# Patient Record
Sex: FEMALE | Race: WHITE | Employment: PART TIME | URBAN - METROPOLITAN AREA
[De-identification: names, ages, dates, MRNs, and addresses within clinical notes are randomized per-mention and may not be internally consistent; named-entity substitution may affect disease eponyms.]

---

## 2018-11-02 ENCOUNTER — HOSPITAL ENCOUNTER (EMERGENCY)
Age: 53
Discharge: HOME | End: 2018-11-02
Attending: EMERGENCY MEDICINE
Payer: COMMERCIAL

## 2018-11-02 ENCOUNTER — APPOINTMENT (OUTPATIENT)
Dept: CT IMAGING | Age: 53
End: 2018-11-02
Payer: COMMERCIAL

## 2018-11-02 VITALS
HEART RATE: 88 BPM | OXYGEN SATURATION: 98 % | RESPIRATION RATE: 18 BRPM | TEMPERATURE: 97.9 F | DIASTOLIC BLOOD PRESSURE: 79 MMHG | SYSTOLIC BLOOD PRESSURE: 122 MMHG | WEIGHT: 150 LBS

## 2018-11-02 DIAGNOSIS — R11.2 NAUSEA VOMITING AND DIARRHEA: Primary | ICD-10-CM

## 2018-11-02 DIAGNOSIS — R19.7 NAUSEA VOMITING AND DIARRHEA: Primary | ICD-10-CM

## 2018-11-02 LAB
ANION GAP SERPL CALCULATED.3IONS-SCNC: 14 MMOL/L (ref 3–16)
BACTERIA: ABNORMAL /HPF
BASOPHILS ABSOLUTE: 0.1 K/UL (ref 0–0.2)
BASOPHILS RELATIVE PERCENT: 0.8 %
BILIRUBIN URINE: NEGATIVE MG/DL
BLOOD, URINE: ABNORMAL
BUN BLDV-MCNC: 17 MG/DL (ref 7–20)
CALCIUM SERPL-MCNC: 9.3 MG/DL (ref 8.3–10.6)
CHLORIDE BLD-SCNC: 106 MMOL/L (ref 99–110)
CLARITY: ABNORMAL
CO2: 22 MMOL/L (ref 21–32)
COLOR: ABNORMAL
CREAT SERPL-MCNC: <0.5 MG/DL (ref 0.6–1.1)
EOSINOPHILS ABSOLUTE: 0.1 K/UL (ref 0–0.6)
EOSINOPHILS RELATIVE PERCENT: 0.5 %
EPITHELIAL CELLS, UA: ABNORMAL /HPF
GFR AFRICAN AMERICAN: >60
GFR NON-AFRICAN AMERICAN: >60
GLUCOSE BLD-MCNC: 107 MG/DL (ref 70–99)
GLUCOSE URINE: NEGATIVE MG/DL
HCT VFR BLD CALC: 44.3 % (ref 36–48)
HEMOGLOBIN: 14.6 G/DL (ref 12–16)
KETONES, URINE: NEGATIVE MG/DL
LEUKOCYTE ESTERASE, URINE: NEGATIVE
LYMPHOCYTES ABSOLUTE: 2.7 K/UL (ref 1–5.1)
LYMPHOCYTES RELATIVE PERCENT: 15.4 %
MCH RBC QN AUTO: 32.2 PG (ref 26–34)
MCHC RBC AUTO-ENTMCNC: 32.9 G/DL (ref 31–36)
MCV RBC AUTO: 97.9 FL (ref 80–100)
MICROSCOPIC EXAMINATION: YES
MONOCYTES ABSOLUTE: 1.6 K/UL (ref 0–1.3)
MONOCYTES RELATIVE PERCENT: 9.2 %
MUCUS: ABNORMAL /LPF
NEUTROPHILS ABSOLUTE: 13.2 K/UL (ref 1.7–7.7)
NEUTROPHILS RELATIVE PERCENT: 74.1 %
NITRITE, URINE: NEGATIVE
PDW BLD-RTO: 14.1 % (ref 12.4–15.4)
PH UA: 6
PLATELET # BLD: 231 K/UL (ref 135–450)
PMV BLD AUTO: 8.5 FL (ref 5–10.5)
POTASSIUM SERPL-SCNC: 3.6 MMOL/L (ref 3.5–5.1)
PREGNANCY, URINE: NEGATIVE
PROTEIN UA: ABNORMAL MG/DL
RBC # BLD: 4.53 M/UL (ref 4–5.2)
RBC UA: ABNORMAL /HPF (ref 0–2)
SODIUM BLD-SCNC: 142 MMOL/L (ref 136–145)
SPECIFIC GRAVITY UA: >=1.03
UROBILINOGEN, URINE: 0.2 E.U./DL
WBC # BLD: 17.8 K/UL (ref 4–11)
WBC UA: ABNORMAL /HPF (ref 0–5)

## 2018-11-02 PROCEDURE — 6360000004 HC RX CONTRAST MEDICATION: Performed by: EMERGENCY MEDICINE

## 2018-11-02 PROCEDURE — 80048 BASIC METABOLIC PNL TOTAL CA: CPT

## 2018-11-02 PROCEDURE — 2580000003 HC RX 258: Performed by: EMERGENCY MEDICINE

## 2018-11-02 PROCEDURE — 99284 EMERGENCY DEPT VISIT MOD MDM: CPT

## 2018-11-02 PROCEDURE — 84703 CHORIONIC GONADOTROPIN ASSAY: CPT

## 2018-11-02 PROCEDURE — 74177 CT ABD & PELVIS W/CONTRAST: CPT

## 2018-11-02 PROCEDURE — 6360000002 HC RX W HCPCS: Performed by: EMERGENCY MEDICINE

## 2018-11-02 PROCEDURE — 96374 THER/PROPH/DIAG INJ IV PUSH: CPT

## 2018-11-02 PROCEDURE — 96361 HYDRATE IV INFUSION ADD-ON: CPT

## 2018-11-02 PROCEDURE — 85025 COMPLETE CBC W/AUTO DIFF WBC: CPT

## 2018-11-02 PROCEDURE — 81001 URINALYSIS AUTO W/SCOPE: CPT

## 2018-11-02 RX ORDER — ONDANSETRON 2 MG/ML
4 INJECTION INTRAMUSCULAR; INTRAVENOUS ONCE
Status: COMPLETED | OUTPATIENT
Start: 2018-11-02 | End: 2018-11-02

## 2018-11-02 RX ORDER — 0.9 % SODIUM CHLORIDE 0.9 %
500 INTRAVENOUS SOLUTION INTRAVENOUS ONCE
Status: COMPLETED | OUTPATIENT
Start: 2018-11-02 | End: 2018-11-02

## 2018-11-02 RX ADMIN — ONDANSETRON 4 MG: 2 INJECTION INTRAMUSCULAR; INTRAVENOUS at 10:59

## 2018-11-02 RX ADMIN — SODIUM CHLORIDE 500 ML: 9 INJECTION, SOLUTION INTRAVENOUS at 10:59

## 2018-11-02 RX ADMIN — IOPAMIDOL 80 ML: 755 INJECTION, SOLUTION INTRAVENOUS at 11:55

## 2018-11-02 ASSESSMENT — ENCOUNTER SYMPTOMS
EYE REDNESS: 0
COLOR CHANGE: 0
EYE DISCHARGE: 0
PHOTOPHOBIA: 0
BLOOD IN STOOL: 0
ABDOMINAL DISTENTION: 0
APNEA: 0
RECTAL PAIN: 0
CHEST TIGHTNESS: 0
EYE PAIN: 0
NAUSEA: 1
COUGH: 0
ABDOMINAL PAIN: 1
STRIDOR: 0
DIARRHEA: 0
BACK PAIN: 0
CONSTIPATION: 0
FACIAL SWELLING: 0
CHOKING: 0
SHORTNESS OF BREATH: 0
ANAL BLEEDING: 0
EYE ITCHING: 0
VOMITING: 1

## 2018-11-02 ASSESSMENT — PAIN SCALES - GENERAL
PAINLEVEL_OUTOF10: 5
PAINLEVEL_OUTOF10: 0

## 2018-11-02 ASSESSMENT — PAIN DESCRIPTION - LOCATION: LOCATION: ABDOMEN

## 2018-11-02 ASSESSMENT — PAIN DESCRIPTION - ORIENTATION: ORIENTATION: LEFT;LOWER

## 2018-11-02 NOTE — ED PROVIDER NOTES
[address] [facility]          EM RESIDENT NOTE       Date of evaluation: 11/2/2018    Chief Complaint     Pelvic Pain and Nausea    History of Present Illness     Juju Interiano is a 48 y.o. female with PMHx of Crohn disease from 36 with last relapse on 1991. who presents with left lower abdominal pain of 2 days duration, pain was severe 8/10, dull in nature, constant, no radiation, associated with nausea and vomiting. Patient endorse she has bowel movement mixed with mucus yesterday but no blood, patient denies H of constipation, diarrhea, Bloating, Hx of travelling, ill contact, joint pain, chest pain, SOB, rash. Review of Systems     Review of Systems   Constitutional: Negative for activity change, appetite change, chills, diaphoresis, fatigue and fever. HENT: Negative for congestion, dental problem, drooling, ear discharge, ear pain, facial swelling, hearing loss and mouth sores. Eyes: Negative for photophobia, pain, discharge, redness and itching. Respiratory: Negative for apnea, cough, choking, chest tightness, shortness of breath and stridor. Cardiovascular: Negative for chest pain, palpitations and leg swelling. Gastrointestinal: Positive for abdominal pain, nausea and vomiting. Negative for abdominal distention, anal bleeding, blood in stool, constipation, diarrhea and rectal pain. Endocrine: Negative for cold intolerance, heat intolerance and polydipsia. Genitourinary: Negative for difficulty urinating, dyspareunia, dysuria, enuresis, flank pain, frequency, genital sores and hematuria. Musculoskeletal: Negative for arthralgias, back pain, gait problem, joint swelling and myalgias. Skin: Negative for color change, pallor, rash and wound. Neurological: Negative for dizziness, seizures, facial asymmetry, light-headedness, numbness and headaches.    Psychiatric/Behavioral: Negative for agitation, behavioral problems, confusion, decreased concentration Result      1. Diffuse colonic wall thickening involving the descending and proximal sigmoid colon. Findings most consistent with underlying inflammatory process such as colitis. Correlate clinically. 2.  Scattered sigmoid diverticulosis. 3.  Hepatomegaly. 4.  Small amount of free fluid in the right paracolic gutter and cul-de-sac, nonspecific. 5.  Multiple subcentimeter hypodense hepatic lesions consistent with hepatic cysts.       6.  Left renal cyst.          LABS:   Results for orders placed or performed during the hospital encounter of 11/02/18   CBC auto differential   Result Value Ref Range    WBC 17.8 (H) 4.0 - 11.0 K/uL    RBC 4.53 4.00 - 5.20 M/uL    Hemoglobin 14.6 12.0 - 16.0 g/dL    Hematocrit 44.3 36.0 - 48.0 %    MCV 97.9 80.0 - 100.0 fL    MCH 32.2 26.0 - 34.0 pg    MCHC 32.9 31.0 - 36.0 g/dL    RDW 14.1 12.4 - 15.4 %    Platelets 974 660 - 756 K/uL    MPV 8.5 5.0 - 10.5 fL    Neutrophils % 74.1 %    Lymphocytes % 15.4 %    Monocytes % 9.2 %    Eosinophils % 0.5 %    Basophils % 0.8 %    Neutrophils # 13.2 (H) 1.7 - 7.7 K/uL    Lymphocytes # 2.7 1.0 - 5.1 K/uL    Monocytes # 1.6 (H) 0.0 - 1.3 K/uL    Eosinophils # 0.1 0.0 - 0.6 K/uL    Basophils # 0.1 0.0 - 0.2 K/uL   Basic Metabolic Panel   Result Value Ref Range    Sodium 142 136 - 145 mmol/L    Potassium 3.6 3.5 - 5.1 mmol/L    Chloride 106 99 - 110 mmol/L    CO2 22 21 - 32 mmol/L    Anion Gap 14 3 - 16    Glucose 107 (H) 70 - 99 mg/dL    BUN 17 7 - 20 mg/dL    CREATININE <0.5 (L) 0.6 - 1.1 mg/dL    GFR Non-African American >60 >60    GFR African American >60 >60    Calcium 9.3 8.3 - 10.6 mg/dL   Microscopic Urinalysis   Result Value Ref Range    Mucus, UA 2+ (A) /LPF    WBC, UA 3-5 0 - 5 /HPF    RBC, UA 5-10 (A) 0 - 2 /HPF    Epi Cells 20-50 /HPF    Bacteria, UA 2+ (A) /HPF   POC URINE with Microscopic   Result Value Ref Range    Color, UA Not Entered Straw/Yellow    Clarity, UA Not Entered Clear    Glucose, Ur Negative

## 2019-03-10 ENCOUNTER — HOSPITAL ENCOUNTER (EMERGENCY)
Age: 54
Discharge: HOME | End: 2019-03-10
Attending: EMERGENCY MEDICINE
Payer: COMMERCIAL

## 2019-03-10 ENCOUNTER — APPOINTMENT (OUTPATIENT)
Dept: GENERAL RADIOLOGY | Age: 54
End: 2019-03-10
Payer: COMMERCIAL

## 2019-03-10 VITALS
TEMPERATURE: 98.3 F | WEIGHT: 150 LBS | HEIGHT: 64 IN | DIASTOLIC BLOOD PRESSURE: 90 MMHG | HEART RATE: 90 BPM | RESPIRATION RATE: 16 BRPM | SYSTOLIC BLOOD PRESSURE: 142 MMHG | BODY MASS INDEX: 25.61 KG/M2 | OXYGEN SATURATION: 95 %

## 2019-03-10 DIAGNOSIS — J06.9 VIRAL URI: Primary | ICD-10-CM

## 2019-03-10 PROCEDURE — 99284 EMERGENCY DEPT VISIT MOD MDM: CPT

## 2019-03-10 PROCEDURE — 6370000000 HC RX 637 (ALT 250 FOR IP): Performed by: STUDENT IN AN ORGANIZED HEALTH CARE EDUCATION/TRAINING PROGRAM

## 2019-03-10 PROCEDURE — 94640 AIRWAY INHALATION TREATMENT: CPT

## 2019-03-10 PROCEDURE — 71046 X-RAY EXAM CHEST 2 VIEWS: CPT

## 2019-03-10 PROCEDURE — 94761 N-INVAS EAR/PLS OXIMETRY MLT: CPT

## 2019-03-10 PROCEDURE — 94664 DEMO&/EVAL PT USE INHALER: CPT

## 2019-03-10 RX ORDER — IPRATROPIUM BROMIDE AND ALBUTEROL SULFATE 2.5; .5 MG/3ML; MG/3ML
1 SOLUTION RESPIRATORY (INHALATION) ONCE
Status: COMPLETED | OUTPATIENT
Start: 2019-03-10 | End: 2019-03-10

## 2019-03-10 RX ORDER — IBUPROFEN 400 MG/1
400 TABLET ORAL ONCE
Status: COMPLETED | OUTPATIENT
Start: 2019-03-10 | End: 2019-03-10

## 2019-03-10 RX ADMIN — IBUPROFEN 400 MG: 400 TABLET, FILM COATED ORAL at 23:35

## 2019-03-10 RX ADMIN — IPRATROPIUM BROMIDE AND ALBUTEROL SULFATE 1 AMPULE: .5; 3 SOLUTION RESPIRATORY (INHALATION) at 22:52

## 2019-03-10 ASSESSMENT — PAIN SCALES - GENERAL
PAINLEVEL_OUTOF10: 5
PAINLEVEL_OUTOF10: 5

## 2019-03-10 ASSESSMENT — ENCOUNTER SYMPTOMS
SHORTNESS OF BREATH: 1
COUGH: 1
ABDOMINAL PAIN: 0
DIARRHEA: 0
VOMITING: 0
NAUSEA: 0
SORE THROAT: 0
WHEEZING: 1

## 2019-03-10 ASSESSMENT — PAIN DESCRIPTION - PAIN TYPE: TYPE: ACUTE PAIN

## 2019-03-10 ASSESSMENT — PAIN DESCRIPTION - LOCATION: LOCATION: NECK;HEAD

## 2020-08-26 ENCOUNTER — HOSPITAL ENCOUNTER (EMERGENCY)
Age: 55
Discharge: HOME | End: 2020-08-26
Payer: COMMERCIAL

## 2020-08-26 VITALS
DIASTOLIC BLOOD PRESSURE: 71 MMHG | HEART RATE: 65 BPM | SYSTOLIC BLOOD PRESSURE: 120 MMHG | BODY MASS INDEX: 26.58 KG/M2 | WEIGHT: 150 LBS | HEIGHT: 63 IN | RESPIRATION RATE: 16 BRPM | OXYGEN SATURATION: 96 % | TEMPERATURE: 98.2 F

## 2020-08-26 PROCEDURE — 99282 EMERGENCY DEPT VISIT SF MDM: CPT

## 2020-08-26 PROCEDURE — 2500000003 HC RX 250 WO HCPCS: Performed by: PHYSICIAN ASSISTANT

## 2020-08-26 PROCEDURE — 90715 TDAP VACCINE 7 YRS/> IM: CPT | Performed by: PHYSICIAN ASSISTANT

## 2020-08-26 PROCEDURE — 6360000002 HC RX W HCPCS: Performed by: PHYSICIAN ASSISTANT

## 2020-08-26 PROCEDURE — 90471 IMMUNIZATION ADMIN: CPT | Performed by: PHYSICIAN ASSISTANT

## 2020-08-26 PROCEDURE — 6370000000 HC RX 637 (ALT 250 FOR IP): Performed by: PHYSICIAN ASSISTANT

## 2020-08-26 PROCEDURE — 12001 RPR S/N/AX/GEN/TRNK 2.5CM/<: CPT

## 2020-08-26 RX ORDER — LIDOCAINE HYDROCHLORIDE AND EPINEPHRINE 10; 10 MG/ML; UG/ML
10 INJECTION, SOLUTION INFILTRATION; PERINEURAL ONCE
Status: COMPLETED | OUTPATIENT
Start: 2020-08-26 | End: 2020-08-26

## 2020-08-26 RX ORDER — BACITRACIN ZINC AND POLYMYXIN B SULFATE 500; 1000 [USP'U]/G; [USP'U]/G
OINTMENT TOPICAL ONCE
Status: COMPLETED | OUTPATIENT
Start: 2020-08-26 | End: 2020-08-26

## 2020-08-26 RX ORDER — GINSENG 100 MG
CAPSULE ORAL
Status: COMPLETED
Start: 2020-08-26 | End: 2020-08-26

## 2020-08-26 RX ADMIN — LIDOCAINE HYDROCHLORIDE,EPINEPHRINE BITARTRATE 10 ML: 10; .01 INJECTION, SOLUTION INFILTRATION; PERINEURAL at 14:23

## 2020-08-26 RX ADMIN — BACITRACIN ZINC AND POLYMYXIN B SULFATE: at 15:55

## 2020-08-26 RX ADMIN — TETANUS TOXOID, REDUCED DIPHTHERIA TOXOID AND ACELLULAR PERTUSSIS VACCINE, ADSORBED 0.5 ML: 5; 2.5; 8; 8; 2.5 SUSPENSION INTRAMUSCULAR at 14:20

## 2020-08-26 ASSESSMENT — PAIN SCALES - GENERAL: PAINLEVEL_OUTOF10: 0

## 2020-08-26 ASSESSMENT — ENCOUNTER SYMPTOMS: COLOR CHANGE: 0

## 2020-08-26 NOTE — ED PROVIDER NOTES
[facility]          PHYSICIAN ASSISTANT NOTE       Date of evaluation: 8/26/2020    Chief Complaint     Laceration (Right foot laceration after stepping on a piece of glass. Dressing in place on arrival)      History of Present Illness     Lucita Interiano is a 54 y.o. female who presents to the emergency department with a right foot laceration. Patient states that she was at her mother's place around noon today cleaning up the basement. She states that she went to walk upstairs and stepped on a piece of glass. It caused a small laceration to the plantar surface of her foot at the base of the great toe. Bleeding is controlled. No numbness. Patient is unsure of her last tetanus. Review of Systems     Review of Systems   Constitutional: Negative for chills, diaphoresis and fever. Musculoskeletal: Negative for arthralgias. Skin: Positive for wound. Negative for color change, pallor and rash. Allergic/Immunologic: Negative for environmental allergies, food allergies and immunocompromised state. Neurological: Negative for dizziness. Hematological: Negative for adenopathy. Does not bruise/bleed easily. Psychiatric/Behavioral: Negative for self-injury. Past Medical, Surgical, Family, and Social History     She has no past medical history on file. She has no past surgical history on file. Her family history is not on file. She reports that she has been smoking. She has been smoking about 1.00 pack per day. She has never used smokeless tobacco. She reports that she does not drink alcohol or use drugs. Medications     There are no discharge medications for this patient. Allergies     She has No Known Allergies. Physical Exam     INITIAL VITALS: BP: 105/69,Temp: 98.2 °F (36.8 °C), Pulse: 64, Resp: 16, SpO2: 97 %   Physical Exam  Constitutional:       General: She is not in acute distress. Appearance: Normal appearance.  She is not procedure: Tolerated well, no immediate complications  Comments:      Lac repair performed by the PA student. I was present for the entirety of the procedure. ED Course     Nursing Notes, Past Medical Hx, Past Surgical Hx, Social Hx, Allergies, and Family Hx were reviewed. The patient was given the followingmedications:  Orders Placed This Encounter   Medications    lidocaine-EPINEPHrine 1 percent-1:[id number] injection 10 mL    Tetanus-Diphth-Acell Pertussis (BOOSTRIX) injection 0.5 mL    bacitracin-polymyxin b (POLYSPORIN) ointment    bacitracin 500 UNIT/GM ointment     Mesa Peal: cabinet override       CONSULTS:  None    MEDICAL Angelina Langstonlon / ASSESSMENT / Maryanne Ashley is a 54 y.o. female who presented to the emergency department with a right foot laceration after stepping on a piece of glass 2 hours prior to arrival.  Bleeding is controlled. Patient denies any numbness. She is unsure of her last tetanus. Physical exam reveals a 1 cm V-shaped laceration on the plantar surface of the right foot base of the first toe. It is hemostatic. Intact range of motion of the right foot and toes. Sensation intact. Distal pulses palpable. Tetanus was updated today. Patient took ibuprofen earlier today prior to arrival.  Wound was anesthetized using 1% lidocaine with epinephrine. Wound was explored and no glass was visualized. Wound was extensively irrigated. It was repaired with 3 nonabsorbable sutures. Refer to procedure note above. Bulky dressing was placed over the wound. Patient was instructed on suture removal in 7 to 10 days. She is given return precautions in the event of developing infection. Clinical Impression     1.  Laceration of right foot, initial encounter        Disposition     PATIENT REFERRED TO:  Lorenzo Loving., MD  [address] [place] [id number] [phone]    In 1 week  For suture removal      DISCHARGE MEDICATIONS:  There are no discharge medications for this patient.        DISPOSITION Decision To Discharge 08/26/2020 03:54:38 PM       Selam Coronado PA-C  08/26/20 Bakari Zavaleta PA-C  08/26/20 1991

## 2020-08-26 NOTE — ED NOTES
Right foot with laceration on ball of foot just below great toe. Bleeding controlled. Approx 0.5cm in length. No active bleeding.  Full ROM intact     Owen Bach RN  08/26/20 [id number]

## 2020-08-26 NOTE — ED NOTES
Bed: A03-03  Expected date:   Expected time:   Means of arrival:   Comments:  NO USE     Héctor Cardoza RN  08/26/20 2093

## 2020-08-26 NOTE — ED NOTES
Patient prepared for and ready to be discharged. Dressed in clothes and given belongings. Patient discharged at this time in no acute distress after she verbalized understanding of discharge instructions. Reviewed medications, and when to return to the ED with patient.  Encouraged follow up with PCP in 7-10 days for suture removal.  Patient walked to waleska Mcmillan RN  08/26/20 2024

## 2020-09-29 ENCOUNTER — APPOINTMENT (OUTPATIENT)
Dept: CT IMAGING | Age: 55
DRG: 720 | End: 2020-09-29
Payer: COMMERCIAL

## 2020-09-29 ENCOUNTER — HOSPITAL ENCOUNTER (INPATIENT)
Age: 55
LOS: 5 days | Discharge: HOME | DRG: 720 | End: 2020-10-04
Attending: EMERGENCY MEDICINE | Admitting: INTERNAL MEDICINE
Payer: COMMERCIAL

## 2020-09-29 PROBLEM — R10.9 FLANK PAIN: Status: ACTIVE | Noted: 2020-09-29

## 2020-09-29 LAB
A/G RATIO: 1.8 (ref 1.1–2.2)
ALBUMIN SERPL-MCNC: 4.4 G/DL (ref 3.4–5)
ALP BLD-CCNC: 97 U/L (ref 40–129)
ALT SERPL-CCNC: 38 U/L (ref 10–40)
ANION GAP SERPL CALCULATED.3IONS-SCNC: 13 MMOL/L (ref 3–16)
AST SERPL-CCNC: 31 U/L (ref 15–37)
BACTERIA: ABNORMAL /HPF
BASOPHILS ABSOLUTE: 0.1 K/UL (ref 0–0.2)
BASOPHILS RELATIVE PERCENT: 1 %
BILIRUB SERPL-MCNC: 0.3 MG/DL (ref 0–1)
BILIRUBIN URINE: ABNORMAL
BLOOD, URINE: ABNORMAL
BUN BLDV-MCNC: 26 MG/DL (ref 7–20)
CALCIUM SERPL-MCNC: 9.2 MG/DL (ref 8.3–10.6)
CHLORIDE BLD-SCNC: 108 MMOL/L (ref 99–110)
CLARITY: ABNORMAL
CO2: 23 MMOL/L (ref 21–32)
COLOR: ABNORMAL
CREAT SERPL-MCNC: 0.8 MG/DL (ref 0.6–1.1)
EOSINOPHILS ABSOLUTE: 0.3 K/UL (ref 0–0.6)
EOSINOPHILS RELATIVE PERCENT: 3.4 %
GFR AFRICAN AMERICAN: >60
GFR NON-AFRICAN AMERICAN: >60
GLOBULIN: 2.4 G/DL
GLUCOSE BLD-MCNC: 102 MG/DL (ref 70–99)
GLUCOSE URINE: NEGATIVE MG/DL
HCT VFR BLD CALC: 43.6 % (ref 36–48)
HEMOGLOBIN: 14.8 G/DL (ref 12–16)
INR BLD: 1.02 (ref 0.86–1.14)
KETONES, URINE: 15 MG/DL
LEUKOCYTE ESTERASE, URINE: ABNORMAL
LIPASE: 24 U/L (ref 13–60)
LYMPHOCYTES ABSOLUTE: 3.5 K/UL (ref 1–5.1)
LYMPHOCYTES RELATIVE PERCENT: 38.9 %
MCH RBC QN AUTO: 33.4 PG (ref 26–34)
MCHC RBC AUTO-ENTMCNC: 33.9 G/DL (ref 31–36)
MCV RBC AUTO: 98.6 FL (ref 80–100)
MICROSCOPIC EXAMINATION: YES
MONOCYTES ABSOLUTE: 0.7 K/UL (ref 0–1.3)
MONOCYTES RELATIVE PERCENT: 7.8 %
NEUTROPHILS ABSOLUTE: 4.5 K/UL (ref 1.7–7.7)
NEUTROPHILS RELATIVE PERCENT: 48.9 %
NITRITE, URINE: POSITIVE
PDW BLD-RTO: 14.1 % (ref 12.4–15.4)
PH UA: 8 (ref 5–8)
PLATELET # BLD: 239 K/UL (ref 135–450)
PMV BLD AUTO: 8 FL (ref 5–10.5)
POTASSIUM REFLEX MAGNESIUM: 3.8 MMOL/L (ref 3.5–5.1)
PROTEIN UA: 100 MG/DL
PROTHROMBIN TIME: 11.8 SEC (ref 10–13.2)
RBC # BLD: 4.42 M/UL (ref 4–5.2)
RBC UA: >100 /HPF (ref 0–4)
SODIUM BLD-SCNC: 144 MMOL/L (ref 136–145)
SPECIFIC GRAVITY UA: 1.02 (ref 1–1.03)
TOTAL PROTEIN: 6.8 G/DL (ref 6.4–8.2)
URINE REFLEX TO CULTURE: ABNORMAL
URINE TYPE: ABNORMAL
UROBILINOGEN, URINE: 1 E.U./DL
WBC # BLD: 9.1 K/UL (ref 4–11)
WBC UA: ABNORMAL /HPF (ref 0–5)

## 2020-09-29 PROCEDURE — 1200000000 HC SEMI PRIVATE

## 2020-09-29 PROCEDURE — 81001 URINALYSIS AUTO W/SCOPE: CPT

## 2020-09-29 PROCEDURE — 99285 EMERGENCY DEPT VISIT HI MDM: CPT

## 2020-09-29 PROCEDURE — 83690 ASSAY OF LIPASE: CPT

## 2020-09-29 PROCEDURE — 96374 THER/PROPH/DIAG INJ IV PUSH: CPT

## 2020-09-29 PROCEDURE — 96375 TX/PRO/DX INJ NEW DRUG ADDON: CPT

## 2020-09-29 PROCEDURE — 80053 COMPREHEN METABOLIC PANEL: CPT

## 2020-09-29 PROCEDURE — 74176 CT ABD & PELVIS W/O CONTRAST: CPT

## 2020-09-29 PROCEDURE — 2580000003 HC RX 258: Performed by: INTERNAL MEDICINE

## 2020-09-29 PROCEDURE — 36415 COLL VENOUS BLD VENIPUNCTURE: CPT

## 2020-09-29 PROCEDURE — 2580000003 HC RX 258: Performed by: PHYSICIAN ASSISTANT

## 2020-09-29 PROCEDURE — 6360000002 HC RX W HCPCS: Performed by: PHYSICIAN ASSISTANT

## 2020-09-29 PROCEDURE — 85610 PROTHROMBIN TIME: CPT

## 2020-09-29 PROCEDURE — 85025 COMPLETE CBC W/AUTO DIFF WBC: CPT

## 2020-09-29 PROCEDURE — 6360000002 HC RX W HCPCS: Performed by: EMERGENCY MEDICINE

## 2020-09-29 PROCEDURE — 6370000000 HC RX 637 (ALT 250 FOR IP): Performed by: INTERNAL MEDICINE

## 2020-09-29 RX ORDER — ONDANSETRON 2 MG/ML
4 INJECTION INTRAMUSCULAR; INTRAVENOUS ONCE
Status: COMPLETED | OUTPATIENT
Start: 2020-09-29 | End: 2020-09-29

## 2020-09-29 RX ORDER — SODIUM CHLORIDE, SODIUM LACTATE, POTASSIUM CHLORIDE, CALCIUM CHLORIDE 600; 310; 30; 20 MG/100ML; MG/100ML; MG/100ML; MG/100ML
1000 INJECTION, SOLUTION INTRAVENOUS ONCE
Status: COMPLETED | OUTPATIENT
Start: 2020-09-29 | End: 2020-09-29

## 2020-09-29 RX ORDER — SODIUM CHLORIDE 0.9 % (FLUSH) 0.9 %
10 SYRINGE (ML) INJECTION PRN
Status: DISCONTINUED | OUTPATIENT
Start: 2020-09-29 | End: 2020-10-04 | Stop reason: HOSPADM

## 2020-09-29 RX ORDER — POTASSIUM CHLORIDE 7.45 MG/ML
10 INJECTION INTRAVENOUS PRN
Status: DISCONTINUED | OUTPATIENT
Start: 2020-09-29 | End: 2020-10-02 | Stop reason: SDUPTHER

## 2020-09-29 RX ORDER — PROMETHAZINE HYDROCHLORIDE 25 MG/1
12.5 TABLET ORAL EVERY 6 HOURS PRN
Status: DISCONTINUED | OUTPATIENT
Start: 2020-09-29 | End: 2020-10-04 | Stop reason: HOSPADM

## 2020-09-29 RX ORDER — ONDANSETRON 2 MG/ML
4 INJECTION INTRAMUSCULAR; INTRAVENOUS EVERY 6 HOURS PRN
Status: DISCONTINUED | OUTPATIENT
Start: 2020-09-29 | End: 2020-10-04 | Stop reason: HOSPADM

## 2020-09-29 RX ORDER — SODIUM CHLORIDE 0.9 % (FLUSH) 0.9 %
10 SYRINGE (ML) INJECTION EVERY 12 HOURS SCHEDULED
Status: DISCONTINUED | OUTPATIENT
Start: 2020-09-29 | End: 2020-10-04 | Stop reason: HOSPADM

## 2020-09-29 RX ORDER — KETOROLAC TROMETHAMINE 30 MG/ML
15 INJECTION, SOLUTION INTRAMUSCULAR; INTRAVENOUS ONCE
Status: COMPLETED | OUTPATIENT
Start: 2020-09-29 | End: 2020-09-29

## 2020-09-29 RX ORDER — SENNA AND DOCUSATE SODIUM 50; 8.6 MG/1; MG/1
1 TABLET, FILM COATED ORAL 2 TIMES DAILY
Status: DISCONTINUED | OUTPATIENT
Start: 2020-09-29 | End: 2020-10-04 | Stop reason: HOSPADM

## 2020-09-29 RX ORDER — ACETAMINOPHEN 650 MG/1
650 SUPPOSITORY RECTAL EVERY 6 HOURS PRN
Status: DISCONTINUED | OUTPATIENT
Start: 2020-09-29 | End: 2020-10-04 | Stop reason: HOSPADM

## 2020-09-29 RX ORDER — ACETAMINOPHEN 325 MG/1
650 TABLET ORAL EVERY 6 HOURS PRN
Status: DISCONTINUED | OUTPATIENT
Start: 2020-09-29 | End: 2020-10-04 | Stop reason: HOSPADM

## 2020-09-29 RX ORDER — MORPHINE SULFATE 4 MG/ML
4 INJECTION, SOLUTION INTRAMUSCULAR; INTRAVENOUS ONCE
Status: COMPLETED | OUTPATIENT
Start: 2020-09-29 | End: 2020-09-29

## 2020-09-29 RX ORDER — MAGNESIUM SULFATE 1 G/100ML
1 INJECTION INTRAVENOUS PRN
Status: DISCONTINUED | OUTPATIENT
Start: 2020-09-29 | End: 2020-10-04 | Stop reason: HOSPADM

## 2020-09-29 RX ORDER — FAMOTIDINE 20 MG/1
20 TABLET, FILM COATED ORAL 2 TIMES DAILY
Status: DISCONTINUED | OUTPATIENT
Start: 2020-09-29 | End: 2020-10-04 | Stop reason: HOSPADM

## 2020-09-29 RX ADMIN — HYDROMORPHONE HYDROCHLORIDE 1 MG: 1 INJECTION, SOLUTION INTRAMUSCULAR; INTRAVENOUS; SUBCUTANEOUS at 16:32

## 2020-09-29 RX ADMIN — MORPHINE SULFATE 4 MG: 4 INJECTION, SOLUTION INTRAMUSCULAR; INTRAVENOUS at 17:22

## 2020-09-29 RX ADMIN — Medication 10 ML: at 22:49

## 2020-09-29 RX ADMIN — KETOROLAC TROMETHAMINE 15 MG: 30 INJECTION, SOLUTION INTRAMUSCULAR at 16:20

## 2020-09-29 RX ADMIN — ONDANSETRON 4 MG: 2 INJECTION INTRAMUSCULAR; INTRAVENOUS at 16:20

## 2020-09-29 RX ADMIN — HYDROMORPHONE HYDROCHLORIDE 1 MG: 1 INJECTION, SOLUTION INTRAMUSCULAR; INTRAVENOUS; SUBCUTANEOUS at 18:13

## 2020-09-29 RX ADMIN — HYDROMORPHONE HYDROCHLORIDE 1 MG: 1 INJECTION, SOLUTION INTRAMUSCULAR; INTRAVENOUS; SUBCUTANEOUS at 20:18

## 2020-09-29 RX ADMIN — SODIUM CHLORIDE, POTASSIUM CHLORIDE, SODIUM LACTATE AND CALCIUM CHLORIDE 1000 ML: 600; 310; 30; 20 INJECTION, SOLUTION INTRAVENOUS at 16:20

## 2020-09-29 RX ADMIN — FAMOTIDINE 20 MG: 20 TABLET, FILM COATED ORAL at 22:48

## 2020-09-29 ASSESSMENT — PAIN DESCRIPTION - ONSET
ONSET: GRADUAL
ONSET: ON-GOING

## 2020-09-29 ASSESSMENT — PAIN DESCRIPTION - PROGRESSION
CLINICAL_PROGRESSION: NOT CHANGED
CLINICAL_PROGRESSION: GRADUALLY IMPROVING

## 2020-09-29 ASSESSMENT — PAIN - FUNCTIONAL ASSESSMENT
PAIN_FUNCTIONAL_ASSESSMENT: ACTIVITIES ARE NOT PREVENTED
PAIN_FUNCTIONAL_ASSESSMENT: ACTIVITIES ARE NOT PREVENTED

## 2020-09-29 ASSESSMENT — PAIN SCALES - GENERAL
PAINLEVEL_OUTOF10: 2
PAINLEVEL_OUTOF10: 10
PAINLEVEL_OUTOF10: 6
PAINLEVEL_OUTOF10: 3
PAINLEVEL_OUTOF10: 10
PAINLEVEL_OUTOF10: 6
PAINLEVEL_OUTOF10: 8
PAINLEVEL_OUTOF10: 5

## 2020-09-29 ASSESSMENT — PAIN DESCRIPTION - DESCRIPTORS
DESCRIPTORS: SHARP
DESCRIPTORS: SHARP

## 2020-09-29 ASSESSMENT — PAIN DESCRIPTION - ORIENTATION
ORIENTATION: LEFT

## 2020-09-29 ASSESSMENT — ENCOUNTER SYMPTOMS
CONSTIPATION: 0
ABDOMINAL PAIN: 0
VOMITING: 1
SHORTNESS OF BREATH: 0
DIARRHEA: 0
NAUSEA: 1

## 2020-09-29 ASSESSMENT — PAIN DESCRIPTION - DIRECTION
RADIATING_TOWARDS: ABDOMEN
RADIATING_TOWARDS: ABDOMEN

## 2020-09-29 ASSESSMENT — PAIN DESCRIPTION - LOCATION
LOCATION: BACK;FLANK
LOCATION: BACK
LOCATION: BACK;FLANK

## 2020-09-29 ASSESSMENT — PAIN DESCRIPTION - FREQUENCY
FREQUENCY: INTERMITTENT
FREQUENCY: CONTINUOUS

## 2020-09-29 ASSESSMENT — PAIN DESCRIPTION - PAIN TYPE
TYPE: ACUTE PAIN

## 2020-09-29 NOTE — ED PROVIDER NOTES
[facility]          PHYSICIAN ASSISTANT NOTE       Date of evaluation: 9/29/2020    Chief Complaint     Flank Pain (noticed hematuria this AM, started with back pain that radiates into stomach, some relief with iburpofen, PCP has given referral to urology but unable to get in today)      History of Present Illness     HPI: Georgi Parada is a 54 y.o. female with no pertinent PMH who presents to the emergency department with left-sided flank pain. The patient has had dark, brown-colored urine for the past 1 week and noticed bright red blood in her urine at 9 AM this morning. She then developed sudden onset of left-sided flank pain 1 hour prior to her arrival.  She has had associated nausea and vomiting. She denies any radiation of the pain into her abdomen. She denies any changes in bowel movements. She had similar pain a few nights ago at which time she took ibuprofen and her pain resolved after she woke up. She denies any fevers or chills. With the exception of the above, there are no aggravating or alleviating factors. Review of Systems     Review of Systems   Constitutional: Negative for chills and fever. Respiratory: Negative for shortness of breath. Gastrointestinal: Positive for nausea and vomiting. Negative for abdominal pain, constipation and diarrhea. Genitourinary: Positive for flank pain and hematuria. Neurological: Negative for dizziness and headaches. As stated above, all other systems reviewed and are otherwise negative. Past Medical, Surgical, Family, and Social History     She has no past medical history on file. She has no past surgical history on file. Her family history is not on file. She reports that she has been smoking. She has been smoking about 1.00 pack per day. She has never used smokeless tobacco. She reports that she does not drink alcohol or use drugs.     Medications     Previous Medications    No medications on file       Allergies     She has No Known Allergies. Physical Exam     INITIAL VITALS: BP: 129/88, Temp: 98.2 °F (36.8 °C), Pulse: 65, Resp: 18, SpO2: 100 %  Physical Exam  Constitutional:       General: She is not in acute distress. Appearance: Normal appearance. She is normal weight. She is ill-appearing. She is not toxic-appearing or diaphoretic. HENT:      Head: Normocephalic and atraumatic. Nose: Nose normal.      Mouth/Throat:      Mouth: Mucous membranes are moist.      Pharynx: Oropharynx is clear. Eyes:      Extraocular Movements: Extraocular movements intact. Neck:      Musculoskeletal: Normal range of motion. Cardiovascular:      Rate and Rhythm: Normal rate. Pulmonary:      Effort: Pulmonary effort is normal. No respiratory distress. Abdominal:      General: Abdomen is flat. There is no distension. Palpations: Abdomen is soft. Tenderness: There is no abdominal tenderness. There is left CVA tenderness. There is no guarding or rebound. Musculoskeletal: Normal range of motion. Skin:     General: Skin is warm and dry. Neurological:      General: No focal deficit present. Mental Status: She is alert and oriented to person, place, and time. Psychiatric:         Mood and Affect: Mood normal.         Behavior: Behavior normal.       Diagnostic Results     RADIOLOGY:  CT ABDOMEN PELVIS WO CONTRAST Additional Contrast? None   Final Result   1. Heterogeneous mixed attenuation density within the renal pelvis on the left extending toward the upper pole calyx. This results in moderate left hydronephrosis. Differential considerations favor transitional cell carcinoma or hemorrhage within the    collecting system. Urologic consultation is recommended along with further evaluation via ureteroscopy. 2. Additional nonobstructing bilateral renal calculi.    3. Stable left renal cyst.             LABS:   Results for orders placed or performed during the hospital encounter of 09/29/20   CBC Auto Differential   Result Value Ref Range    WBC 9.1 4.0 - 11.0 K/uL    RBC 4.42 4.00 - 5.20 M/uL    Hemoglobin 14.8 12.0 - 16.0 g/dL    Hematocrit 43.6 36.0 - 48.0 %    MCV 98.6 80.0 - 100.0 fL    MCH 33.4 26.0 - 34.0 pg    MCHC 33.9 31.0 - 36.0 g/dL    RDW 14.1 12.4 - 15.4 %    Platelets 363 684 - 602 K/uL    MPV 8.0 5.0 - 10.5 fL    Neutrophils % 48.9 %    Lymphocytes % 38.9 %    Monocytes % 7.8 %    Eosinophils % 3.4 %    Basophils % 1.0 %    Neutrophils Absolute 4.5 1.7 - 7.7 K/uL    Lymphocytes Absolute 3.5 1.0 - 5.1 K/uL    Monocytes Absolute 0.7 0.0 - 1.3 K/uL    Eosinophils Absolute 0.3 0.0 - 0.6 K/uL    Basophils Absolute 0.1 0.0 - 0.2 K/uL   Comprehensive Metabolic Panel w/ Reflex to MG   Result Value Ref Range    Sodium 144 136 - 145 mmol/L    Potassium reflex Magnesium 3.8 3.5 - 5.1 mmol/L    Chloride 108 99 - 110 mmol/L    CO2 23 21 - 32 mmol/L    Anion Gap 13 3 - 16    Glucose 102 (H) 70 - 99 mg/dL    BUN 26 (H) 7 - 20 mg/dL    CREATININE 0.8 0.6 - 1.1 mg/dL    GFR Non-African American >60 >60    GFR African American >60 >60    Calcium 9.2 8.3 - 10.6 mg/dL    Total Protein 6.8 6.4 - 8.2 g/dL    Alb 4.4 3.4 - 5.0 g/dL    Albumin/Globulin Ratio 1.8 1.1 - 2.2    Total Bilirubin 0.3 0.0 - 1.0 mg/dL    Alkaline Phosphatase 97 40 - 129 U/L    ALT 38 10 - 40 U/L    AST 31 15 - 37 U/L    Globulin 2.4 g/dL   Lipase   Result Value Ref Range    Lipase 24.0 13.0 - 60.0 U/L     RECENT VITALS:  BP: 129/88, Temp: 98.2 °F (36.8 °C), Pulse: 65, Resp: 18, SpO2: 100 %     Procedures     n/a    ED Course     Nursing Notes, Past Medical Hx,Past Surgical Hx, Social Hx, Allergies, and Family Hx were reviewed.     The patient was given the following medications:  Orders Placed This Encounter   Medications    lactated ringers infusion 1,000 mL    ketorolac (TORADOL) injection 15 mg    ondansetron (ZOFRAN) injection 4 mg    HYDROmorphone (DILAUDID) injection 1 mg    morphine injection 4 mg DISPOSITION  - Admit pending discussions with hospitalist / urology.      SUKHJINDER Jones  09/29/20 3549

## 2020-09-29 NOTE — ED NOTES
Bed: B17-17  Expected date:   Expected time:   Means of arrival:   Comments:  Reading- hematuria and flank pain     Marcell Sanches RN  09/29/20 6934

## 2020-09-29 NOTE — H&P
non-distended, no hepatosplenomegaly, L flank pain present  Musculoskeletal: No cyanosis, clubbing or petechiae, no lower extremity misalignment, asymmetry, or crepitation  Skin: Normal skin color, texture, turgor. No rashes or lesions noted. Psychiatric: Alert and oriented x4, good insight and judgment    Labs:     Recent Labs     09/29/20  1613   WBC 9.1   HGB 14.8   HCT 43.6        Recent Labs     09/29/20  1613      K 3.8      CO2 23   BUN 26*   CREATININE 0.8   CALCIUM 9.2     Recent Labs     09/29/20  1613   AST 31   ALT 38   BILITOT 0.3   ALKPHOS 97     No results for input(s): INR in the last 72 hours. No results for input(s): Christophe Snowball in the last 72 hours. Urinalysis:      Lab Results   Component Value Date    NITRU Negative 11/02/2018    WBCUA 3-5 11/02/2018    BACTERIA 2+ 11/02/2018    RBCUA 5-10 11/02/2018    BLOODU LARGE 11/02/2018    SPECGRAV >=1.030 11/02/2018    GLUCOSEU Negative 11/02/2018       Radiology:     CT ABDOMEN PELVIS WO CONTRAST Additional Contrast? None   Final Result   1. Heterogeneous mixed attenuation density within the renal pelvis on the left extending toward the upper pole calyx. This results in moderate left hydronephrosis. Differential considerations favor transitional cell carcinoma or hemorrhage within the    collecting system. Urologic consultation is recommended along with further evaluation via ureteroscopy. 2. Additional nonobstructing bilateral renal calculi.    3. Stable left renal cyst.             ASSESSMENT:    Active Hospital Problems    Diagnosis Date Noted    Flank pain [R10.9] 09/29/2020       PLAN:    # L flank pain  # L kidney mass, carcinoma vs. hemorrhage  -unclear etiology  -urology consulted, will see in AM  -analgesia as needed  -NPO at midnight  -check pre-op labs    DVT Prophylaxis: Lovenox  Diet: No diet orders on file  Code Status: No Order    PT/OT Eval Status: n/a, baseline    Dispo - inpt, pending urology evaluation/clearance, clinical improvement. Domo Erwin MD    Thank you Sydney Adhikari MD for the opportunity to be involved in this patient's care. If you have any questions or concerns please feel free to contact me at [phone].

## 2020-09-29 NOTE — ED PROVIDER NOTES
ED Attending Attestation Note     Date of evaluation: 9/29/2020    This patient was seen by the advance practice provider. I have seen and examined the patient, agree with the workup, evaluation, management and diagnosis. The care plan has been discussed. My assessment reveals patient moaning in pain secondary to left flank pain. History of renal cyst.  Noticed discoloration of urine. My exam no peritoneal signs. She does lay supine crying in pain. Pain control attempted with Toradol and Dilaudid 1 mg.      Esther Nyhan, MD  09/29/20 [id number]

## 2020-09-30 ENCOUNTER — ANESTHESIA EVENT (OUTPATIENT)
Dept: OPERATING ROOM | Age: 55
DRG: 720 | End: 2020-09-30
Payer: COMMERCIAL

## 2020-09-30 ENCOUNTER — ANESTHESIA (OUTPATIENT)
Dept: OPERATING ROOM | Age: 55
DRG: 720 | End: 2020-09-30
Payer: COMMERCIAL

## 2020-09-30 ENCOUNTER — APPOINTMENT (OUTPATIENT)
Dept: GENERAL RADIOLOGY | Age: 55
DRG: 720 | End: 2020-09-30
Payer: COMMERCIAL

## 2020-09-30 VITALS
RESPIRATION RATE: 2 BRPM | SYSTOLIC BLOOD PRESSURE: 172 MMHG | DIASTOLIC BLOOD PRESSURE: 82 MMHG | OXYGEN SATURATION: 100 %

## 2020-09-30 LAB
ANION GAP SERPL CALCULATED.3IONS-SCNC: 11 MMOL/L (ref 3–16)
BUN BLDV-MCNC: 22 MG/DL (ref 7–20)
CALCIUM SERPL-MCNC: 8.9 MG/DL (ref 8.3–10.6)
CHLORIDE BLD-SCNC: 104 MMOL/L (ref 99–110)
CO2: 24 MMOL/L (ref 21–32)
CREAT SERPL-MCNC: 1 MG/DL (ref 0.6–1.1)
GFR AFRICAN AMERICAN: >60
GFR NON-AFRICAN AMERICAN: 58
GLUCOSE BLD-MCNC: 127 MG/DL (ref 70–99)
HCT VFR BLD CALC: 39.7 % (ref 36–48)
HCT VFR BLD CALC: 43.2 % (ref 36–48)
HEMOGLOBIN: 13.3 G/DL (ref 12–16)
HEMOGLOBIN: 14.3 G/DL (ref 12–16)
LACTIC ACID, SEPSIS: 1.6 MMOL/L (ref 0.4–1.9)
MCH RBC QN AUTO: 32.6 PG (ref 26–34)
MCHC RBC AUTO-ENTMCNC: 33.1 G/DL (ref 31–36)
MCV RBC AUTO: 98.4 FL (ref 80–100)
PDW BLD-RTO: 14.1 % (ref 12.4–15.4)
PLATELET # BLD: 200 K/UL (ref 135–450)
PMV BLD AUTO: 8.2 FL (ref 5–10.5)
POTASSIUM REFLEX MAGNESIUM: 3.7 MMOL/L (ref 3.5–5.1)
RBC # BLD: 4.39 M/UL (ref 4–5.2)
SODIUM BLD-SCNC: 139 MMOL/L (ref 136–145)
WBC # BLD: 16.5 K/UL (ref 4–11)

## 2020-09-30 PROCEDURE — 6360000002 HC RX W HCPCS: Performed by: NURSE ANESTHETIST, CERTIFIED REGISTERED

## 2020-09-30 PROCEDURE — C1758 CATHETER, URETERAL: HCPCS | Performed by: UROLOGY

## 2020-09-30 PROCEDURE — 7100000000 HC PACU RECOVERY - FIRST 15 MIN: Performed by: UROLOGY

## 2020-09-30 PROCEDURE — 88112 CYTOPATH CELL ENHANCE TECH: CPT

## 2020-09-30 PROCEDURE — 85014 HEMATOCRIT: CPT

## 2020-09-30 PROCEDURE — 2580000003 HC RX 258: Performed by: INTERNAL MEDICINE

## 2020-09-30 PROCEDURE — 6360000002 HC RX W HCPCS

## 2020-09-30 PROCEDURE — 36415 COLL VENOUS BLD VENIPUNCTURE: CPT

## 2020-09-30 PROCEDURE — 6370000000 HC RX 637 (ALT 250 FOR IP): Performed by: INTERNAL MEDICINE

## 2020-09-30 PROCEDURE — 2580000003 HC RX 258: Performed by: STUDENT IN AN ORGANIZED HEALTH CARE EDUCATION/TRAINING PROGRAM

## 2020-09-30 PROCEDURE — 6360000002 HC RX W HCPCS: Performed by: INTERNAL MEDICINE

## 2020-09-30 PROCEDURE — 3600000014 HC SURGERY LEVEL 4 ADDTL 15MIN: Performed by: UROLOGY

## 2020-09-30 PROCEDURE — 87040 BLOOD CULTURE FOR BACTERIA: CPT

## 2020-09-30 PROCEDURE — 2060000000 HC ICU INTERMEDIATE R&B

## 2020-09-30 PROCEDURE — 0T778DZ DILATION OF LEFT URETER WITH INTRALUMINAL DEVICE, VIA NATURAL OR ARTIFICIAL OPENING ENDOSCOPIC: ICD-10-PCS | Performed by: UROLOGY

## 2020-09-30 PROCEDURE — 2580000003 HC RX 258: Performed by: UROLOGY

## 2020-09-30 PROCEDURE — BT141ZZ FLUOROSCOPY OF KIDNEYS, URETERS AND BLADDER USING LOW OSMOLAR CONTRAST: ICD-10-PCS | Performed by: UROLOGY

## 2020-09-30 PROCEDURE — 3700000000 HC ANESTHESIA ATTENDED CARE: Performed by: UROLOGY

## 2020-09-30 PROCEDURE — 83605 ASSAY OF LACTIC ACID: CPT

## 2020-09-30 PROCEDURE — 2580000003 HC RX 258: Performed by: NURSE ANESTHETIST, CERTIFIED REGISTERED

## 2020-09-30 PROCEDURE — 85027 COMPLETE CBC AUTOMATED: CPT

## 2020-09-30 PROCEDURE — 80048 BASIC METABOLIC PNL TOTAL CA: CPT

## 2020-09-30 PROCEDURE — 85018 HEMOGLOBIN: CPT

## 2020-09-30 PROCEDURE — 3600000004 HC SURGERY LEVEL 4 BASE: Performed by: UROLOGY

## 2020-09-30 PROCEDURE — C2617 STENT, NON-COR, TEM W/O DEL: HCPCS | Performed by: UROLOGY

## 2020-09-30 PROCEDURE — 7100000001 HC PACU RECOVERY - ADDTL 15 MIN: Performed by: UROLOGY

## 2020-09-30 PROCEDURE — 2709999900 HC NON-CHARGEABLE SUPPLY: Performed by: UROLOGY

## 2020-09-30 PROCEDURE — 74420 UROGRAPHY RTRGR +-KUB: CPT

## 2020-09-30 PROCEDURE — 3700000001 HC ADD 15 MINUTES (ANESTHESIA): Performed by: UROLOGY

## 2020-09-30 PROCEDURE — 6370000000 HC RX 637 (ALT 250 FOR IP): Performed by: UROLOGY

## 2020-09-30 PROCEDURE — C1769 GUIDE WIRE: HCPCS | Performed by: UROLOGY

## 2020-09-30 PROCEDURE — 6360000002 HC RX W HCPCS: Performed by: ANESTHESIOLOGY

## 2020-09-30 DEVICE — URETERAL STENT
Type: IMPLANTABLE DEVICE | Site: URETER | Status: FUNCTIONAL
Brand: POLARIS™ ULTRA

## 2020-09-30 RX ORDER — SODIUM CHLORIDE 0.9 % (FLUSH) 0.9 %
10 SYRINGE (ML) INJECTION PRN
Status: DISCONTINUED | OUTPATIENT
Start: 2020-09-30 | End: 2020-09-30

## 2020-09-30 RX ORDER — SODIUM CHLORIDE 9 MG/ML
INJECTION, SOLUTION INTRAVENOUS CONTINUOUS PRN
Status: DISCONTINUED | OUTPATIENT
Start: 2020-09-30 | End: 2020-09-30 | Stop reason: SDUPTHER

## 2020-09-30 RX ORDER — SODIUM CHLORIDE 0.9 % (FLUSH) 0.9 %
10 SYRINGE (ML) INJECTION EVERY 12 HOURS SCHEDULED
Status: DISCONTINUED | OUTPATIENT
Start: 2020-09-30 | End: 2020-09-30

## 2020-09-30 RX ORDER — FENTANYL CITRATE 50 UG/ML
50 INJECTION, SOLUTION INTRAMUSCULAR; INTRAVENOUS EVERY 5 MIN PRN
Status: DISCONTINUED | OUTPATIENT
Start: 2020-09-30 | End: 2020-09-30 | Stop reason: HOSPADM

## 2020-09-30 RX ORDER — OXYCODONE HYDROCHLORIDE AND ACETAMINOPHEN 5; 325 MG/1; MG/1
2 TABLET ORAL PRN
Status: DISCONTINUED | OUTPATIENT
Start: 2020-09-30 | End: 2020-09-30 | Stop reason: HOSPADM

## 2020-09-30 RX ORDER — NICOTINE 21 MG/24HR
1 PATCH, TRANSDERMAL 24 HOURS TRANSDERMAL DAILY
Status: DISCONTINUED | OUTPATIENT
Start: 2020-09-30 | End: 2020-10-04 | Stop reason: HOSPADM

## 2020-09-30 RX ORDER — DIPHENHYDRAMINE HYDROCHLORIDE 50 MG/ML
12.5 INJECTION INTRAMUSCULAR; INTRAVENOUS
Status: DISCONTINUED | OUTPATIENT
Start: 2020-09-30 | End: 2020-09-30 | Stop reason: HOSPADM

## 2020-09-30 RX ORDER — 0.9 % SODIUM CHLORIDE 0.9 %
1000 INTRAVENOUS SOLUTION INTRAVENOUS ONCE
Status: COMPLETED | OUTPATIENT
Start: 2020-09-30 | End: 2020-09-30

## 2020-09-30 RX ORDER — MAGNESIUM HYDROXIDE 1200 MG/15ML
LIQUID ORAL PRN
Status: DISCONTINUED | OUTPATIENT
Start: 2020-09-30 | End: 2020-09-30 | Stop reason: HOSPADM

## 2020-09-30 RX ORDER — HYDRALAZINE HYDROCHLORIDE 20 MG/ML
5 INJECTION INTRAMUSCULAR; INTRAVENOUS EVERY 10 MIN PRN
Status: DISCONTINUED | OUTPATIENT
Start: 2020-09-30 | End: 2020-09-30 | Stop reason: HOSPADM

## 2020-09-30 RX ORDER — ATROPA BELLADONNA AND OPIUM 16.2; 6 MG/1; MG/1
60 SUPPOSITORY RECTAL
Status: COMPLETED | OUTPATIENT
Start: 2020-09-30 | End: 2020-09-30

## 2020-09-30 RX ORDER — FENTANYL CITRATE 50 UG/ML
INJECTION, SOLUTION INTRAMUSCULAR; INTRAVENOUS
Status: COMPLETED
Start: 2020-09-30 | End: 2020-09-30

## 2020-09-30 RX ORDER — LIDOCAINE HYDROCHLORIDE 20 MG/ML
INJECTION, SOLUTION INTRAVENOUS PRN
Status: DISCONTINUED | OUTPATIENT
Start: 2020-09-30 | End: 2020-09-30 | Stop reason: SDUPTHER

## 2020-09-30 RX ORDER — LORAZEPAM 2 MG/ML
1 INJECTION INTRAMUSCULAR ONCE
Status: COMPLETED | OUTPATIENT
Start: 2020-09-30 | End: 2020-09-30

## 2020-09-30 RX ORDER — MEPERIDINE HYDROCHLORIDE 25 MG/ML
INJECTION INTRAMUSCULAR; INTRAVENOUS; SUBCUTANEOUS
Status: COMPLETED
Start: 2020-09-30 | End: 2020-09-30

## 2020-09-30 RX ORDER — SODIUM CHLORIDE 9 MG/ML
INJECTION, SOLUTION INTRAVENOUS CONTINUOUS
Status: ACTIVE | OUTPATIENT
Start: 2020-09-30 | End: 2020-10-01

## 2020-09-30 RX ORDER — ONDANSETRON 2 MG/ML
4 INJECTION INTRAMUSCULAR; INTRAVENOUS
Status: DISCONTINUED | OUTPATIENT
Start: 2020-09-30 | End: 2020-09-30 | Stop reason: HOSPADM

## 2020-09-30 RX ORDER — FENTANYL CITRATE 50 UG/ML
INJECTION, SOLUTION INTRAMUSCULAR; INTRAVENOUS PRN
Status: DISCONTINUED | OUTPATIENT
Start: 2020-09-30 | End: 2020-09-30 | Stop reason: SDUPTHER

## 2020-09-30 RX ORDER — FENTANYL CITRATE 50 UG/ML
25 INJECTION, SOLUTION INTRAMUSCULAR; INTRAVENOUS EVERY 5 MIN PRN
Status: DISCONTINUED | OUTPATIENT
Start: 2020-09-30 | End: 2020-09-30 | Stop reason: HOSPADM

## 2020-09-30 RX ORDER — PROCHLORPERAZINE EDISYLATE 5 MG/ML
5 INJECTION INTRAMUSCULAR; INTRAVENOUS
Status: COMPLETED | OUTPATIENT
Start: 2020-09-30 | End: 2020-09-30

## 2020-09-30 RX ORDER — MEPERIDINE HYDROCHLORIDE 25 MG/ML
12.5 INJECTION INTRAMUSCULAR; INTRAVENOUS; SUBCUTANEOUS EVERY 5 MIN PRN
Status: DISCONTINUED | OUTPATIENT
Start: 2020-09-30 | End: 2020-09-30 | Stop reason: HOSPADM

## 2020-09-30 RX ORDER — ONDANSETRON 2 MG/ML
INJECTION INTRAMUSCULAR; INTRAVENOUS PRN
Status: DISCONTINUED | OUTPATIENT
Start: 2020-09-30 | End: 2020-09-30 | Stop reason: SDUPTHER

## 2020-09-30 RX ORDER — LABETALOL 20 MG/4 ML (5 MG/ML) INTRAVENOUS SYRINGE
5 EVERY 10 MIN PRN
Status: DISCONTINUED | OUTPATIENT
Start: 2020-09-30 | End: 2020-09-30 | Stop reason: HOSPADM

## 2020-09-30 RX ORDER — MIDAZOLAM HYDROCHLORIDE 1 MG/ML
INJECTION INTRAMUSCULAR; INTRAVENOUS PRN
Status: DISCONTINUED | OUTPATIENT
Start: 2020-09-30 | End: 2020-09-30 | Stop reason: SDUPTHER

## 2020-09-30 RX ORDER — PROPOFOL 10 MG/ML
INJECTION, EMULSION INTRAVENOUS PRN
Status: DISCONTINUED | OUTPATIENT
Start: 2020-09-30 | End: 2020-09-30 | Stop reason: SDUPTHER

## 2020-09-30 RX ORDER — OXYCODONE HYDROCHLORIDE AND ACETAMINOPHEN 5; 325 MG/1; MG/1
1 TABLET ORAL PRN
Status: DISCONTINUED | OUTPATIENT
Start: 2020-09-30 | End: 2020-09-30 | Stop reason: HOSPADM

## 2020-09-30 RX ADMIN — FENTANYL CITRATE 25 MCG: 50 INJECTION, SOLUTION INTRAMUSCULAR; INTRAVENOUS at 14:56

## 2020-09-30 RX ADMIN — SODIUM CHLORIDE 1000 ML: 9 INJECTION, SOLUTION INTRAVENOUS at 18:30

## 2020-09-30 RX ADMIN — HYDROMORPHONE HYDROCHLORIDE 0.5 MG: 1 INJECTION, SOLUTION INTRAMUSCULAR; INTRAVENOUS; SUBCUTANEOUS at 16:21

## 2020-09-30 RX ADMIN — MEPERIDINE HYDROCHLORIDE 12.5 MG: 25 INJECTION INTRAMUSCULAR; INTRAVENOUS; SUBCUTANEOUS at 13:55

## 2020-09-30 RX ADMIN — PROCHLORPERAZINE EDISYLATE 5 MG: 5 INJECTION INTRAMUSCULAR; INTRAVENOUS at 15:04

## 2020-09-30 RX ADMIN — FENTANYL CITRATE 25 MCG: 50 INJECTION, SOLUTION INTRAMUSCULAR; INTRAVENOUS at 15:15

## 2020-09-30 RX ADMIN — SODIUM CHLORIDE: 9 INJECTION, SOLUTION INTRAVENOUS at 13:33

## 2020-09-30 RX ADMIN — FENTANYL CITRATE 50 MCG: 50 INJECTION, SOLUTION INTRAMUSCULAR; INTRAVENOUS at 15:07

## 2020-09-30 RX ADMIN — FAMOTIDINE 20 MG: 20 TABLET, FILM COATED ORAL at 23:47

## 2020-09-30 RX ADMIN — LIDOCAINE HYDROCHLORIDE 100 MG: 20 INJECTION, SOLUTION INTRAVENOUS at 13:09

## 2020-09-30 RX ADMIN — ACETAMINOPHEN 650 MG: 325 TABLET ORAL at 07:01

## 2020-09-30 RX ADMIN — ONDANSETRON 4 MG: 2 INJECTION INTRAMUSCULAR; INTRAVENOUS at 13:23

## 2020-09-30 RX ADMIN — Medication 10 ML: at 23:23

## 2020-09-30 RX ADMIN — SODIUM CHLORIDE: 9 INJECTION, SOLUTION INTRAVENOUS at 13:11

## 2020-09-30 RX ADMIN — Medication 10 ML: at 00:01

## 2020-09-30 RX ADMIN — ACETAMINOPHEN 650 MG: 325 TABLET ORAL at 23:47

## 2020-09-30 RX ADMIN — PROPOFOL 50 MG: 10 INJECTION, EMULSION INTRAVENOUS at 13:30

## 2020-09-30 RX ADMIN — CEFTRIAXONE 1 G: 1 INJECTION, POWDER, FOR SOLUTION INTRAMUSCULAR; INTRAVENOUS at 13:10

## 2020-09-30 RX ADMIN — HYDROMORPHONE HYDROCHLORIDE 0.5 MG: 1 INJECTION, SOLUTION INTRAMUSCULAR; INTRAVENOUS; SUBCUTANEOUS at 00:01

## 2020-09-30 RX ADMIN — CEFTRIAXONE 1 G: 1 INJECTION, POWDER, FOR SOLUTION INTRAMUSCULAR; INTRAVENOUS at 11:29

## 2020-09-30 RX ADMIN — Medication 10 ML: at 09:19

## 2020-09-30 RX ADMIN — MIDAZOLAM HYDROCHLORIDE 2 MG: 2 INJECTION, SOLUTION INTRAMUSCULAR; INTRAVENOUS at 13:09

## 2020-09-30 RX ADMIN — ACETAMINOPHEN 650 MG: 650 SUPPOSITORY RECTAL at 14:29

## 2020-09-30 RX ADMIN — PROPOFOL 150 MG: 10 INJECTION, EMULSION INTRAVENOUS at 13:09

## 2020-09-30 RX ADMIN — Medication 10 ML: at 02:42

## 2020-09-30 RX ADMIN — LORAZEPAM 1 MG: 2 INJECTION INTRAMUSCULAR; INTRAVENOUS at 11:14

## 2020-09-30 RX ADMIN — ONDANSETRON 4 MG: 2 INJECTION INTRAMUSCULAR; INTRAVENOUS at 02:42

## 2020-09-30 RX ADMIN — FENTANYL CITRATE 100 MCG: 50 INJECTION INTRAMUSCULAR; INTRAVENOUS at 13:09

## 2020-09-30 RX ADMIN — HYDROMORPHONE HYDROCHLORIDE 0.5 MG: 1 INJECTION, SOLUTION INTRAMUSCULAR; INTRAVENOUS; SUBCUTANEOUS at 07:05

## 2020-09-30 RX ADMIN — SODIUM CHLORIDE: 9 INJECTION, SOLUTION INTRAVENOUS at 23:10

## 2020-09-30 RX ADMIN — ATROPA BELLADONNA AND OPIUM 60 MG: 16.2; 6 SUPPOSITORY RECTAL at 15:08

## 2020-09-30 ASSESSMENT — PULMONARY FUNCTION TESTS
PIF_VALUE: 9
PIF_VALUE: 0
PIF_VALUE: 4
PIF_VALUE: 4
PIF_VALUE: 2
PIF_VALUE: 1
PIF_VALUE: 4
PIF_VALUE: 3
PIF_VALUE: 7
PIF_VALUE: 1
PIF_VALUE: 1
PIF_VALUE: 2
PIF_VALUE: 5
PIF_VALUE: 11
PIF_VALUE: 1
PIF_VALUE: 4
PIF_VALUE: 1
PIF_VALUE: 4
PIF_VALUE: 4
PIF_VALUE: 3
PIF_VALUE: 4
PIF_VALUE: 5
PIF_VALUE: 18
PIF_VALUE: 5
PIF_VALUE: 1
PIF_VALUE: 3
PIF_VALUE: 4
PIF_VALUE: 3
PIF_VALUE: 17
PIF_VALUE: 5
PIF_VALUE: 0
PIF_VALUE: 0
PIF_VALUE: 2
PIF_VALUE: 3
PIF_VALUE: 4
PIF_VALUE: 6
PIF_VALUE: 0
PIF_VALUE: 7
PIF_VALUE: 3
PIF_VALUE: 0
PIF_VALUE: 5
PIF_VALUE: 0
PIF_VALUE: 1
PIF_VALUE: 14
PIF_VALUE: 11

## 2020-09-30 ASSESSMENT — PAIN DESCRIPTION - ORIENTATION
ORIENTATION: LEFT

## 2020-09-30 ASSESSMENT — PAIN SCALES - GENERAL
PAINLEVEL_OUTOF10: 7
PAINLEVEL_OUTOF10: 8
PAINLEVEL_OUTOF10: 6
PAINLEVEL_OUTOF10: 6
PAINLEVEL_OUTOF10: 3
PAINLEVEL_OUTOF10: 10
PAINLEVEL_OUTOF10: 3
PAINLEVEL_OUTOF10: 0
PAINLEVEL_OUTOF10: 7

## 2020-09-30 ASSESSMENT — PAIN DESCRIPTION - PAIN TYPE
TYPE: ACUTE PAIN

## 2020-09-30 ASSESSMENT — PAIN DESCRIPTION - DESCRIPTORS
DESCRIPTORS: SHARP
DESCRIPTORS: SHARP
DESCRIPTORS: DISCOMFORT
DESCRIPTORS: ACHING
DESCRIPTORS: PRESSURE

## 2020-09-30 ASSESSMENT — PAIN DESCRIPTION - LOCATION
LOCATION: HEAD
LOCATION: FLANK
LOCATION: BACK;FLANK
LOCATION: BACK;FLANK
LOCATION: FLANK

## 2020-09-30 ASSESSMENT — PAIN - FUNCTIONAL ASSESSMENT
PAIN_FUNCTIONAL_ASSESSMENT: ACTIVITIES ARE NOT PREVENTED

## 2020-09-30 ASSESSMENT — PAIN DESCRIPTION - DIRECTION
RADIATING_TOWARDS: ABDOMEN
RADIATING_TOWARDS: AB

## 2020-09-30 ASSESSMENT — PAIN DESCRIPTION - PROGRESSION
CLINICAL_PROGRESSION: NOT CHANGED

## 2020-09-30 ASSESSMENT — PAIN DESCRIPTION - ONSET
ONSET: SUDDEN
ONSET: ON-GOING
ONSET: ON-GOING

## 2020-09-30 ASSESSMENT — PAIN DESCRIPTION - FREQUENCY
FREQUENCY: CONTINUOUS
FREQUENCY: INTERMITTENT
FREQUENCY: CONTINUOUS

## 2020-09-30 NOTE — PROGRESS NOTES
Patient complaining of nausea, medicated with zofran. Will continue to monitor.    Electronically signed by Juliana Villatoro RN on 9/30/2020 at 2:47 AM

## 2020-09-30 NOTE — ANESTHESIA POSTPROCEDURE EVALUATION
Department of Anesthesiology  Postprocedure Note    Patient: Jeanette Bonds  MRN: [id number]  YOB: 1965  Date of evaluation: 9/30/2020  Time:  2:05 PM     Procedure Summary     Date:  09/30/20 Room / Location:  [address] [place] [id number] / [address]    Anesthesia Start:  0724 Anesthesia Stop:      Procedure:  CYSTOSCOPY, BILATERAL RETROGRADE PYLOGRAM; ATTEMPTED LEFT URETEROSCOPY; EVACUATION OF CLOT FROM BLADDER; LEFT STENT PLACEMENT (Left ) Diagnosis:       Hydronephrosis, left      (Left Hydronephrosis)    Surgeon:  Genaro Garza MD Responsible Provider:  Shana Lucio DO    Anesthesia Type:  general ASA Status:  1          Anesthesia Type: No value filed. Jamaal Phase I:      Jamaal Phase II:      Last vitals: Reviewed and per EMR flowsheets.        Anesthesia Post Evaluation    Patient location during evaluation: PACU  Patient participation: complete - patient participated  Level of consciousness: awake  Pain score: 2  Airway patency: patent  Nausea & Vomiting: no nausea and no vomiting  Complications: no  Cardiovascular status: blood pressure returned to baseline  Respiratory status: acceptable  Hydration status: euvolemic

## 2020-09-30 NOTE — PROGRESS NOTES
Patient admitted to PACU # 3 from OR at 1348 post CYSTOSCOPY, BILATERAL RETROGRADE PYLOGRAM; ATTEMPTED LEFT URETEROSCOPY; EVACUATION OF CLOT FROM BLADDER; LEFT STENT PLACEMENT - Left per Kelvin Phillips MD .  Attached to PACU monitoring system and report received from anesthesia provider. Pt tachypneic and tachycardic upon arrival to PACU. Pt unable to express needs and unable to cooperate/follow commands. CBI present with cherry colored urine in quintero bag.

## 2020-09-30 NOTE — BRIEF OP NOTE
Brief Postoperative Note      Patient: Savanah Interiano  YOB: 1965  MRN: [id number]    Date of Procedure: 9/30/2020    Pre-Op Diagnosis: Left Hydronephrosis    Post-Op Diagnosis: Same       Procedure(s):  CYSTOSCOPY, BILATERAL RETROGRADE PYLOGRAM; ATTEMPTED LEFT URETEROSCOPY; EVACUATION OF CLOT FROM BLADDER; LEFT STENT PLACEMENT    Surgeon(s):  Willian Cox MD    Assistant:  * No surgical staff found *    Anesthesia: General    Estimated Blood Loss (mL): Minimal    Complications: None    Specimens:   ID Type Source Tests Collected by Time Destination   1 : URINE Body Fluid Fluid CYTOLOGY, NON-GYN Willian Cox MD 9/30/2020 1313        Implants:  * No implants in log *      Drains:   Urethral Catheter (Active)       Findings: UNABLE TO DO A LEFT URETEROSCOPY D/T TO URETERAL STENOSIS. CLOT FOUND IN LEFT SIDED COLLECTING SYSTEM, BLOOD WAS CLEARLY COMING FROM THE LEFT SIDE. WE SENT A URINE CYTOLOGY AND PLACED A STENT. SHE WILL LIKELY REQUIRE ANOTHER CYSTO/LEFT URETEROSCOPY AFTER INFECTION IS CLEARED IN A WEEK OR TWO. AN MRI MAY BE HELPFUL AND WILL LIKELY ORDER TOMORROW IF PT FEELING BETTER. We can remove quintero and cbi in am if urine remains clear.     Electronically signed by Jose C Guthrie MD on 9/30/2020 at 1:34 PM

## 2020-09-30 NOTE — PLAN OF CARE
Pain/discomfort being managed with PRN analgesics per MD orders. Pt able to express presence and absence of pain and rate pain appropriately using numerical scale. Pt free from falls this shift. Fall precautions in place at all times. Call light always withinreach. Pt able and agreeable to contact for safety appropriately. Patient currently being treated for nausea at this time.

## 2020-09-30 NOTE — PROGRESS NOTES
Patient admitted to [phone] from ED. A&Ox4. Medicated for c/o pain as needed. RA, sat 92%. Lungs clear. Up ad yohannes to bathroom. Right hand PIV intact and flushed. Skin intact. BP runs neelima at times. No needs at this time. Patient resting comfortably in bed. Call light in reach. Will continue to monitor.    Electronically signed by Fabio Graves RN on 9/30/2020 at 2:15 AM

## 2020-09-30 NOTE — ANESTHESIA PRE PROCEDURE
Department of Anesthesiology  Preprocedure Note       Name:  Lizandro Rodriguez   Age:  54 y.o.  :  1965                                          MRN:  [id number]         Date:  2020      Surgeon: Chrissie Silverman):  Placido Joyce MD    Procedure: Procedure(s):  CYSTOSCOPY, BILATERAL RETROGRADE PYLOGRAM; ATTEMPTED LEFT URETEROSCOPY; EVACUATION OF CLOT FROM BLADDER; LEFT STENT PLACEMENT    Medications prior to admission:   Prior to Admission medications    Not on File       Current medications:    Current Facility-Administered Medications   Medication Dose Route Frequency Provider Last Rate Last Dose    cefTRIAXone (ROCEPHIN) 1 g IVPB in 50 mL D5W minibag  1 g Intravenous Q24H Sherrie Muhammad MD 0 mL/hr at 20 1245 1 g at 20 1310    nicotine (NICODERM CQ) 14 MG/24HR 1 patch  1 patch Transdermal Daily Sherrie Muhammad MD        sterile water for irrigation    PRN Placido Joyce MD   3,000 mL at 20 1243    meperidine (DEMEROL) 25 MG/ML injection             sodium chloride flush 0.9 % injection 10 mL  10 mL Intravenous 2 times per day Paulino Thornton MD   10 mL at 20 0919    sodium chloride flush 0.9 % injection 10 mL  10 mL Intravenous PRN Paulino Thornton MD   10 mL at 20 0242    potassium chloride 10 mEq/100 mL IVPB (Peripheral Line)  10 mEq Intravenous PRN Paulino Thornton MD        magnesium sulfate 1 g in dextrose 5% 100 mL IVPB  1 g Intravenous PRN Paulino Thornton MD        acetaminophen (TYLENOL) tablet 650 mg  650 mg Oral Q6H PRN Paulino Thornton MD   650 mg at 20 0701    Or    acetaminophen (TYLENOL) suppository 650 mg  650 mg Rectal Q6H PRN Paulino Thornton MD        sennosides-docusate sodium (SENOKOT-S) 8.6-50 MG tablet 1 tablet  1 tablet Oral BID Paulino Thornton MD        magnesium hydroxide (MILK OF MAGNESIA) 400 MG/5ML suspension 30 mL  30 mL Oral Daily PRN Paulino Thornton MD        promethazine (PHENERGAN) tablet 12.5 mg  12.5 mg Oral Q6H PRN 98.1 °F (36.7 °C) 98.1 °F (36.7 °C)   TempSrc:  Oral Oral Oral   SpO2: 94% 91% 93% 98%   Weight:  138 lb 0.1 oz (62.6 kg)     Height:  5' 3\" (1.6 m)                                                BP Readings from Last 3 Encounters:   09/30/20 121/72   09/30/20 (!) 172/82   08/26/20 120/71       NPO Status:                                                                                 BMI:   Wt Readings from Last 3 Encounters:   09/29/20 138 lb 0.1 oz (62.6 kg)   08/26/20 150 lb (68 kg)   03/10/19 150 lb (68 kg)     Body mass index is 24.45 kg/m². CBC:   Lab Results   Component Value Date    WBC 16.5 09/30/2020    RBC 4.39 09/30/2020    HGB 14.3 09/30/2020    HCT 43.2 09/30/2020    MCV 98.4 09/30/2020    RDW 14.1 09/30/2020     09/30/2020       CMP:   Lab Results   Component Value Date     09/30/2020    K 3.7 09/30/2020     09/30/2020    CO2 24 09/30/2020    BUN 22 09/30/2020    CREATININE 1.0 09/30/2020    GFRAA >60 09/30/2020    AGRATIO 1.8 09/29/2020    LABGLOM 58 09/30/2020    GLUCOSE 127 09/30/2020    PROT 6.8 09/29/2020    CALCIUM 8.9 09/30/2020    BILITOT 0.3 09/29/2020    ALKPHOS 97 09/29/2020    AST 31 09/29/2020    ALT 38 09/29/2020       POC Tests: No results for input(s): POCGLU, POCNA, POCK, POCCL, POCBUN, POCHEMO, POCHCT in the last 72 hours.     Coags:   Lab Results   Component Value Date    PROTIME 11.8 09/29/2020    INR 1.02 09/29/2020       HCG (If Applicable):   Lab Results   Component Value Date    PREGTESTUR Negative 11/02/2018        ABGs: No results found for: PHART, PO2ART, KHJ9EDO, XNZ6CTF, BEART, E6VSWOQL     Type & Screen (If Applicable):  No results found for: LABABO, LABRH    Drug/Infectious Status (If Applicable):  No results found for: HIV, HEPCAB    COVID-19 Screening (If Applicable): No results found for: COVID19      Anesthesia Evaluation  Patient summary reviewed and Nursing notes reviewed  Airway: Mallampati: III  TM distance: >3 FB   Neck ROM: full  Mouth opening: > = 3 FB Dental: normal exam         Pulmonary:Negative Pulmonary ROS and normal exam  breath sounds clear to auscultation                             Cardiovascular:Negative CV ROS  Exercise tolerance: good (>4 METS),           Rhythm: regular  Rate: normal           Beta Blocker:  Not on Beta Blocker         Neuro/Psych:   Negative Neuro/Psych ROS              GI/Hepatic/Renal: Neg GI/Hepatic/Renal ROS            Endo/Other: Negative Endo/Other ROS                    Abdominal:       Abdomen: soft. Vascular: negative vascular ROS. Anesthesia Plan      general     ASA 1     (Lma)  Induction: intravenous. MIPS: Postoperative opioids intended and Prophylactic antiemetics administered. Anesthetic plan and risks discussed with patient. Use of blood products discussed with patient whom consented to blood products. Plan discussed with attending and CRNA.     Attending anesthesiologist reviewed and agrees with Pre Eval content              Renato Saucedo DO   9/30/2020

## 2020-09-30 NOTE — PROGRESS NOTES
Urology Progress Note      BP (!) 154/80   Pulse 112   Temp 98.1 °F (36.7 °C) (Oral)   Resp 18   Ht 5' 3\" (1.6 m)   Wt 138 lb 0.1 oz (62.6 kg) Comment: pt states 150 is more accurate  SpO2 93%   BMI 24.45 kg/m²   Temp  Av.1 °F (36.7 °C)  Min: 97.9 °F (36.6 °C)  Max: 98.2 °F (36.8 °C)    Films reviewed. Needs left ureteroscopy. Will add on for today. Npo.        Ubaldo Pérez MD

## 2020-09-30 NOTE — FLOWSHEET NOTE
09/30/20 1415   Vital Signs   Temp 103.1 °F (39.5 °C)   Temp Source Oral   Pulse 124   Resp (!) 33   BP (!) 158/95   BP Location Left lower arm   MAP (mmHg) 127   Oxygen Therapy   SpO2 97 %       Pt restless, c/o nausea, c/o left flank pain, c/o constant urge to urinate. Dr Bryant Hence notified. Order received for Belladonna suppository. Pt given IV zofran and IV compazine, Acetaminophen suppository, IV Fentanyl and Belladonna suppository. IV fluid bolus also given. Dr Johnny Arana. Javad notified. New orders received. Lab currently at bedside drawing blood cultures and lactate.     Pt VS at 1600  Temp 99.7    RR 29  /79  O2 98% on 3L via nasal

## 2020-09-30 NOTE — PROGRESS NOTES
Hospitalist Progress Note      PCP: Mayte Weathers MD    Date of Admission: 9/29/2020    Subjective: Patient seen and examined in PACU  -Status post cystoscopy today, unable to do left ureteroscopy due to ureteral stenosis  -Patient was hypotensive in PACU after IV pain medication as well as early sepsis      Medications:  Reviewed    Infusion Medications   Scheduled Medications    cefTRIAXone (ROCEPHIN) IV  1 g Intravenous Q24H    nicotine  1 patch Transdermal Daily    sodium chloride flush  10 mL Intravenous 2 times per day    sennosides-docusate sodium  1 tablet Oral BID    famotidine  20 mg Oral BID    enoxaparin  40 mg Subcutaneous Daily     PRN Meds: sterile water for irrigation, fentanNYL, fentanNYL, HYDROmorphone, HYDROmorphone, oxyCODONE-acetaminophen **OR** oxyCODONE-acetaminophen, diphenhydrAMINE, ondansetron, labetalol, hydrALAZINE, meperidine, HYDROmorphone, sodium chloride flush, potassium chloride, magnesium sulfate, acetaminophen **OR** acetaminophen, magnesium hydroxide, promethazine **OR** ondansetron      Intake/Output Summary (Last 24 hours) at 9/30/2020 1600  Last data filed at 9/30/2020 1515  Gross per 24 hour   Intake 305 ml   Output -965 ml   Net 1270 ml       Physical Exam Performed:    BP 97/76   Pulse 113   Temp 101.1 °F (38.4 °C) (Oral)   Resp 26   Ht 5' 3\" (1.6 m)   Wt 138 lb 0.1 oz (62.6 kg) Comment: pt states 150 is more accurate  SpO2 99%   BMI 24.45 kg/m²     General appearance: No apparent distress, appears stated age and cooperative. On oxygen  HEENT: Pupils equal, round, and reactive to light. Conjunctivae/corneas clear. Neck: Supple, with full range of motion. No jugular venous distention. Trachea midline. Respiratory:  Normal respiratory effort. Clear to auscultation, bilaterally without Rales/Wheezes/Rhonchi. Cardiovascular: Regular rate and rhythm with normal S1/S2 without murmurs, rubs or gallops.   Abdomen: Soft, non-tender, non-distended with normal bowel sounds. Musculoskeletal: No clubbing, cyanosis or edema bilaterally. Full range of motion without deformity. Skin: Skin color, texture, turgor normal.  No rashes or lesions. Neurologic:  Neurovascularly intact without any focal sensory/motor deficits. Cranial nerves: II-XII intact, grossly non-focal.  Psychiatric: Alert and oriented, thought content appropriate, normal insight  Capillary Refill: Brisk,< 3 seconds   Peripheral Pulses: +2 palpable, equal bilaterally       Labs:   Recent Labs     09/29/20  1613 09/30/20  0458   WBC 9.1 16.5*   HGB 14.8 14.3   HCT 43.6 43.2    200     Recent Labs     09/29/20  1613 09/30/20  0458    139   K 3.8 3.7    104   CO2 23 24   BUN 26* 22*   CREATININE 0.8 1.0   CALCIUM 9.2 8.9     Recent Labs     09/29/20  1613   AST 31   ALT 38   BILITOT 0.3   ALKPHOS 97     Recent Labs     09/29/20  2204   INR 1.02     No results for input(s): Car Ankita in the last 72 hours. Urinalysis:      Lab Results   Component Value Date    NITRU POSITIVE 09/29/2020    WBCUA 3-5 09/29/2020    BACTERIA 4+ 09/29/2020    RBCUA >100 09/29/2020    BLOODU LARGE 09/29/2020    SPECGRAV 1.020 09/29/2020    GLUCOSEU Negative 09/29/2020       Radiology:  FL RETROGRADE PYELOGRAM W WO KUB   Final Result   1. Left hydroureteronephrosis. 2. Satisfactory stent placement. CT ABDOMEN PELVIS WO CONTRAST Additional Contrast? None   Final Result   1. Heterogeneous mixed attenuation density within the renal pelvis on the left extending toward the upper pole calyx. This results in moderate left hydronephrosis. Differential considerations favor transitional cell carcinoma or hemorrhage within the    collecting system. Urologic consultation is recommended along with further evaluation via ureteroscopy. 2. Additional nonobstructing bilateral renal calculi.    3. Stable left renal cyst.                 Assessment/Plan:    Active Hospital Problems    Diagnosis    Flank pain [R10.9]     # L flank pain with  L kidney mass, carcinoma vs. hemorrhage  -unclear etiology  -urology consulted, status post cystoscopy today  -analgesia as needed    Hypotension :-likely postop with anesthesia and postop pain control  With early sepsis  -continue iv abx  Iv fluids  Transfer to pacu  Telemetry    UTI  On iv abx  cx pending  Consult urology      DVT Prophylaxis: scds  Diet: Diet NPO Effective Now  Code Status: Full Code    PT/OT Eval Status: n/a     Dispo - inpatient     Veronica Paredes MD

## 2020-09-30 NOTE — CONSULTS
Urology Attending Consult Note      Reason for Consultation: LEFT HYDRO, CYST AND BLOOD IN COLLECTING SYSTEM    History: 53 YO WITH SEVERE LEFT FLANK PAIN AND NAUSEA. SHE HAD A CT SHOWING LEFT HYDRO AND CLOT IN HER COLLECTING SYSTEM AS WELL AS A LARGE CYST. SHE HAS AND ELEVATED WBC COUNT AND NITRITE POSITIVE URINE    Family History, Social History, Review of Systems:  Reviewed and agreed to as per chart    Vitals:  /72   Pulse 98   Temp 98.1 °F (36.7 °C) (Oral)   Resp 16   Ht 5' 3\" (1.6 m)   Wt 138 lb 0.1 oz (62.6 kg) Comment: pt states 150 is more accurate  SpO2 98%   BMI 24.45 kg/m²   Temp  Av.1 °F (36.7 °C)  Min: 97.9 °F (36.6 °C)  Max: 98.2 °F (36.8 °C)    Intake/Output Summary (Last 24 hours) at 2020 1300  Last data filed at 2020 0247  Gross per 24 hour   Intake 300 ml   Output 500 ml   Net -200 ml         Physical:   Well developed, well nourished in no acute distress   Mood indicates no abnormalities. Pt doesnt appear depressed   Orientated to time and place   Neck is supple, trachea is midline   Respiratory effort is normal   Cardiovascular show no extremity swelling   Abdomen no masses or hernias are palpated, there is no tenderness. Liver and Spleen appear normal.   Skin show no abnormal lesions   Lymph nodes are not palpated in the inguinal, neck, or axillary area. Female :    External Genitalia show no irritation or erythema   Urethral Meatus appears to be normal in size and location   Urethra is normal with no tenderness or masses   Bladder is non tender   Vagina has no masses or discharge   Cervix and Uterus WNL   Adnexa had no masses, tenderness or organomegaly.       Labs:  WBC:    Lab Results   Component Value Date    WBC 16.5 2020     Hemoglobin/Hematocrit:    Lab Results   Component Value Date    HGB 14.3 2020    HCT 43.2 2020     BMP:    Lab Results   Component Value Date     2020    K 3.7 2020     09/30/2020    CO2 24 09/30/2020    BUN 22 09/30/2020    LABALBU 4.4 09/29/2020    CREATININE 1.0 09/30/2020    CALCIUM 8.9 09/30/2020    GFRAA >60 09/30/2020    LABGLOM 58 09/30/2020     PT/INR:    Lab Results   Component Value Date    PROTIME 11.8 09/29/2020    INR 1.02 09/29/2020     PTT:  No results found for: APTT[APTT    Urinalysis: NITRITE POSITIVE        Antibiotic Therapy:  ROCEPHIN    Imaging: CT SCAN  Impression    1.  Heterogeneous mixed attenuation density within the renal pelvis on the left extending toward the upper pole calyx. This results in moderate left hydronephrosis. Differential considerations favor transitional cell carcinoma or hemorrhage within the    collecting system. Urologic consultation is recommended along with further evaluation via ureteroscopy. 2. Additional nonobstructing bilateral renal calculi.     3. Stable left renal cyst.          Impression/Plan: 53 YO WITH LEFT RENAL COLIC, ABNL CT SCAN WITH LEFT CYSTIC STRUCTURE, BLOOD IN COLLECTING SYSTEM  -WITH ELEVATED WBC AND NITRITE POSITIVE URINE, I FAVOR INFECTIOUS ETIOLOGY  -HOWEVER, UROTHELIAL MUST BE RULED OUT  -WILL TAKE URGENLTY TO OR FOR A LEFT STENT AND POSSIBLE URETEROSCOPY    Paulette Whitfield MD

## 2020-10-01 LAB
ANION GAP SERPL CALCULATED.3IONS-SCNC: 12 MMOL/L (ref 3–16)
BUN BLDV-MCNC: 22 MG/DL (ref 7–20)
CALCIUM SERPL-MCNC: 7.5 MG/DL (ref 8.3–10.6)
CHLORIDE BLD-SCNC: 111 MMOL/L (ref 99–110)
CO2: 19 MMOL/L (ref 21–32)
CREAT SERPL-MCNC: 1.2 MG/DL (ref 0.6–1.1)
GFR AFRICAN AMERICAN: 56
GFR NON-AFRICAN AMERICAN: 47
GLUCOSE BLD-MCNC: 119 MG/DL (ref 70–99)
HCT VFR BLD CALC: 36.5 % (ref 36–48)
HEMOGLOBIN: 12.2 G/DL (ref 12–16)
LACTIC ACID, SEPSIS: 1.9 MMOL/L (ref 0.4–1.9)
MCH RBC QN AUTO: 32.9 PG (ref 26–34)
MCHC RBC AUTO-ENTMCNC: 33.3 G/DL (ref 31–36)
MCV RBC AUTO: 98.9 FL (ref 80–100)
PDW BLD-RTO: 14.5 % (ref 12.4–15.4)
PLATELET # BLD: 121 K/UL (ref 135–450)
PMV BLD AUTO: 8.7 FL (ref 5–10.5)
POTASSIUM REFLEX MAGNESIUM: 4 MMOL/L (ref 3.5–5.1)
RBC # BLD: 3.69 M/UL (ref 4–5.2)
SODIUM BLD-SCNC: 142 MMOL/L (ref 136–145)
WBC # BLD: 20.9 K/UL (ref 4–11)

## 2020-10-01 PROCEDURE — 85027 COMPLETE CBC AUTOMATED: CPT

## 2020-10-01 PROCEDURE — 2580000003 HC RX 258: Performed by: INTERNAL MEDICINE

## 2020-10-01 PROCEDURE — 6360000002 HC RX W HCPCS: Performed by: INTERNAL MEDICINE

## 2020-10-01 PROCEDURE — 6370000000 HC RX 637 (ALT 250 FOR IP): Performed by: INTERNAL MEDICINE

## 2020-10-01 PROCEDURE — 83605 ASSAY OF LACTIC ACID: CPT

## 2020-10-01 PROCEDURE — 2060000000 HC ICU INTERMEDIATE R&B

## 2020-10-01 PROCEDURE — 80048 BASIC METABOLIC PNL TOTAL CA: CPT

## 2020-10-01 RX ORDER — SODIUM CHLORIDE 9 MG/ML
INJECTION, SOLUTION INTRAVENOUS CONTINUOUS
Status: DISCONTINUED | OUTPATIENT
Start: 2020-10-01 | End: 2020-10-01

## 2020-10-01 RX ORDER — IBUPROFEN 400 MG/1
400 TABLET ORAL EVERY 6 HOURS PRN
Status: COMPLETED | OUTPATIENT
Start: 2020-10-01 | End: 2020-10-03

## 2020-10-01 RX ADMIN — HYDROMORPHONE HYDROCHLORIDE 0.5 MG: 1 INJECTION, SOLUTION INTRAMUSCULAR; INTRAVENOUS; SUBCUTANEOUS at 13:25

## 2020-10-01 RX ADMIN — FAMOTIDINE 20 MG: 20 TABLET, FILM COATED ORAL at 09:05

## 2020-10-01 RX ADMIN — ENOXAPARIN SODIUM 40 MG: 40 INJECTION SUBCUTANEOUS at 09:05

## 2020-10-01 RX ADMIN — SODIUM CHLORIDE: 9 INJECTION, SOLUTION INTRAVENOUS at 10:50

## 2020-10-01 RX ADMIN — ACETAMINOPHEN 650 MG: 325 TABLET ORAL at 05:50

## 2020-10-01 RX ADMIN — PROMETHAZINE HYDROCHLORIDE 12.5 MG: 25 TABLET ORAL at 21:27

## 2020-10-01 RX ADMIN — FAMOTIDINE 20 MG: 20 TABLET, FILM COATED ORAL at 21:27

## 2020-10-01 RX ADMIN — Medication 10 ML: at 21:17

## 2020-10-01 RX ADMIN — CEFTRIAXONE 1 G: 1 INJECTION, POWDER, FOR SOLUTION INTRAMUSCULAR; INTRAVENOUS at 10:50

## 2020-10-01 RX ADMIN — Medication 10 ML: at 09:05

## 2020-10-01 ASSESSMENT — PAIN DESCRIPTION - LOCATION: LOCATION: HEAD

## 2020-10-01 ASSESSMENT — PAIN DESCRIPTION - FREQUENCY: FREQUENCY: INTERMITTENT

## 2020-10-01 ASSESSMENT — PAIN SCALES - GENERAL
PAINLEVEL_OUTOF10: 3
PAINLEVEL_OUTOF10: 3
PAINLEVEL_OUTOF10: 0
PAINLEVEL_OUTOF10: 8

## 2020-10-01 ASSESSMENT — PAIN DESCRIPTION - PROGRESSION: CLINICAL_PROGRESSION: NOT CHANGED

## 2020-10-01 ASSESSMENT — PAIN DESCRIPTION - PAIN TYPE: TYPE: ACUTE PAIN

## 2020-10-01 ASSESSMENT — PAIN DESCRIPTION - ONSET: ONSET: GRADUAL

## 2020-10-01 ASSESSMENT — PAIN - FUNCTIONAL ASSESSMENT: PAIN_FUNCTIONAL_ASSESSMENT: ACTIVITIES ARE NOT PREVENTED

## 2020-10-01 ASSESSMENT — PAIN DESCRIPTION - DESCRIPTORS: DESCRIPTORS: HEADACHE

## 2020-10-01 NOTE — PROGRESS NOTES
Hospitalist Progress Note      PCP: Rom Alexander MD    Date of Admission: 9/29/2020    Subjective: Patient seen and examined   Feeling better today, blood pressure is better  -Status post cystoscopy and left stent placement on 09/30  Wbc high today    Medications:  Reviewed    Infusion Medications    sodium chloride 125 mL/hr at 10/01/20 1050     Scheduled Medications    cefTRIAXone (ROCEPHIN) IV  1 g Intravenous Q24H    nicotine  1 patch Transdermal Daily    sodium chloride flush  10 mL Intravenous 2 times per day    sennosides-docusate sodium  1 tablet Oral BID    famotidine  20 mg Oral BID    enoxaparin  40 mg Subcutaneous Daily     PRN Meds: HYDROmorphone, sodium chloride flush, potassium chloride, magnesium sulfate, acetaminophen **OR** acetaminophen, magnesium hydroxide, promethazine **OR** ondansetron      Intake/Output Summary (Last 24 hours) at 10/1/2020 1337  Last data filed at 10/1/2020 1321  Gross per 24 hour   Intake 3680 ml   Output 2275 ml   Net 1405 ml       Physical Exam Performed:    BP (!) 149/87   Pulse 91   Temp 98 °F (36.7 °C) (Oral)   Resp 22   Ht 5' 3\" (1.6 m)   Wt 159 lb 9.8 oz (72.4 kg)   SpO2 92%   BMI 28.27 kg/m²     General appearance: No apparent distress, appears stated age and cooperative. On oxygen  HEENT: Pupils equal, round, and reactive to light. Conjunctivae/corneas clear. Neck: Supple, with full range of motion. No jugular venous distention. Trachea midline. Respiratory:  Normal respiratory effort. Clear to auscultation, bilaterally without Rales/Wheezes/Rhonchi. Cardiovascular: Regular rate and rhythm with normal S1/S2 without murmurs, rubs or gallops. Abdomen: Soft, non-tender, non-distended with normal bowel sounds. Musculoskeletal: No clubbing, cyanosis or edema bilaterally. Full range of motion without deformity. Skin: Skin color, texture, turgor normal.  No rashes or lesions.   Neurologic:  Neurovascularly intact without any focal sensory/motor deficits. Cranial nerves: II-XII intact, grossly non-focal.  Psychiatric: Alert and oriented, thought content appropriate, normal insight  Capillary Refill: Brisk,< 3 seconds   Peripheral Pulses: +2 palpable, equal bilaterally       Labs:   Recent Labs     09/29/20  1613 09/30/20  0458 09/30/20  1605 10/01/20  0600   WBC 9.1 16.5*  --  20.9*   HGB 14.8 14.3 13.3 12.2   HCT 43.6 43.2 39.7 36.5    200  --  121*     Recent Labs     09/29/20  1613 09/30/20  0458 10/01/20  0600    139 142   K 3.8 3.7 4.0    104 111*   CO2 23 24 19*   BUN 26* 22* 22*   CREATININE 0.8 1.0 1.2*   CALCIUM 9.2 8.9 7.5*     Recent Labs     09/29/20  1613   AST 31   ALT 38   BILITOT 0.3   ALKPHOS 97     Recent Labs     09/29/20  2204   INR 1.02     No results for input(s): CKTOTAL, TROPONINI in the last 72 hours. Urinalysis:      Lab Results   Component Value Date    NITRU POSITIVE 09/29/2020    WBCUA 3-5 09/29/2020    BACTERIA 4+ 09/29/2020    RBCUA >100 09/29/2020    BLOODU LARGE 09/29/2020    SPECGRAV 1.020 09/29/2020    GLUCOSEU Negative 09/29/2020       Radiology:  FL RETROGRADE PYELOGRAM W WO KUB   Final Result   1. Left hydroureteronephrosis. 2. Satisfactory stent placement. CT ABDOMEN PELVIS WO CONTRAST Additional Contrast? None   Final Result   1. Heterogeneous mixed attenuation density within the renal pelvis on the left extending toward the upper pole calyx. This results in moderate left hydronephrosis. Differential considerations favor transitional cell carcinoma or hemorrhage within the    collecting system. Urologic consultation is recommended along with further evaluation via ureteroscopy. 2. Additional nonobstructing bilateral renal calculi.    3. Stable left renal cyst.                 Assessment/Plan:    Active Hospital Problems    Diagnosis    Flank pain [R10.9]     # L flank pain with  L kidney mass, carcinoma vs. hemorrhage  -unclear etiology  -urology consulted, status post cystoscopy and left stent placement  -analgesia as needed    Hypotension :-Improved now , likely postop with anesthesia and postop pain control  With early sepsis  -continue iv abx  Iv fluids  Telemetry    UTI  On iv abx  cx pending  Consult urology,   Will need a second look  ureteroscopy as outpatient in 1 to 2 weeks      DVT Prophylaxis: scds  Diet: DIET GENERAL;  Code Status: Full Code    PT/OT Eval Status: n/a     Dispo - inpatient pending improvement in WBC    Cali Rocha MD

## 2020-10-01 NOTE — PROGRESS NOTES
PACU Transfer Note    Current Allergies: Patient has no known allergies. Pt meets criteria as per Jamaal Score and ASPAN Standards to transfer to next phase of care. No results for input(s): POCGLU in the last 72 hours. Vitals:    09/30/20 2115   BP: 103/63   Pulse: 117   Resp: (!) 31   Temp:    SpO2: 91%        SpO2: 91 %    O2 Flow Rate (L/min): 2 L/min      Intake/Output Summary (Last 24 hours) at 9/30/2020 2144  Last data filed at 9/30/2020 2045  Gross per 24 hour   Intake 3620 ml   Output 685 ml   Net 2935 ml       Pain assessment:  none    Pain Level: (pt denies pain / just feels like her bladder is full)    NO skin issues noted. Is patient incontinent: no       Handoff report given at bedside with Ren Chavez RN  Family (mother) updated via phone call.       9/30/2020 9:44 PM

## 2020-10-01 NOTE — OP NOTE
[facility] [address], [address]                                OPERATIVE REPORT    PATIENT NAME: Michaela Westbrook                      :        1965  MED REC NO:   [id number]                          ROOM:       5428  ACCOUNT NO:   [de-identified]                           ADMIT DATE: 2020  PROVIDER:     Moo Thorne MD    DATE OF PROCEDURE:  2020    PREOPERATIVE DIAGNOSES:  Left hydro and possible urothelial carcinoma of  the left collecting system. POSTOPERATIVE DIAGNOSES:  Left hydro and possible urothelial carcinoma  of the left collecting system. PROCEDURES PERFORMED:  Cystoscopy, bilateral retrograde pyelogram,  evacuation of clot from bladder, failed left ureteroscopy, and finally  placement of left 7 x 24 cm double-J stent. Ebl: zero    SURGEON:  Moo Thorne MD    INDICATION FOR PROCEDURE:  The patient is a pleasant 51-year-old female  who is in severe pain. She presented with left renal colic. CT scan  shows blood in the collecting system and a large cystic mass involving  the upper pole of the kidney with resultant hydronephrosis. She denies  gross hematuria. Her urine is nitrite positive and white count is  16,000. She presents today for the aforementioned procedure. DESCRIPTION OF PROCEDURE:  After informed consent, the patient was taken  to the operating room. She was prepped and draped in sterile fashion  and was given a dose of preoperative antibiotics. I inserted the rigid  scope into the bladder. There was some small clot in the bladder which  I evacuated. I could see blood coming from the left ureteral orifice. This appeared old and not acute bleeding.   On the right side, I decided  to do a retrograde pyelogram.  The right retrograde was completely  normal.  The left retrograde showed multiple filling defects in the  ureter which were consistent with blood clot as I did move up and down. The left-sided renal mass shows a filling defect again in the renal  pelvis, but the wall of the renal pelvis was actually very smooth  walled. This looked to be just a hemorrhage in the renal pelvis and I  did not see any evidence of urothelial carcinoma. However, due to her  strange presentation, I did elect to place a super stiff wire. Now,  over the top of the wire, I attempted to place a left flexible  ureteroscope. Unfortunately, this would not go due to the stenosis of  the left UO, she is petite and would not accept the scope. Therefore, a  decision was made to place a 7-Syriac 6 x 24 cm double-J stent. This  was done by replacing the sensor wire and over top of this, the stent. There was a good curl in the renal pelvis past the clot as well as in  the bladder. We did see a bit of a post hydronephrotic drip which was  quite bloody. Therefore, to prevent clot forming from the bladder, I  did put her on a low grade of CBI with a 22-Syriac Magdaleno catheter. This  catheter can come out in the morning should the urine be clear. At this  time, I do not have an etiology for the clot in the collecting system. She certainly is appearing to have some sort of infection, but I am  concerned that there still could be urothelial carcinoma. PLAN:  1. Follow up on cytology. 2.  Repeat left ureteroscopy in one to two weeks after allowing for  passive dilation of the collecting system with the stent in place. 3.  Remove Magdaleno catheter and CBI tomorrow if urine remains clear.         Kelli Marrero MD    D: 09/30/2020 13:50:53       T: 09/30/2020 17:09:01     [email or web address]  Job#: [id number]     Doc#: [id number]    CC:

## 2020-10-01 NOTE — PROGRESS NOTES
Urology Attending Progress Note      Subjective: c/o headache    Vitals:  /85   Pulse 96   Temp 98 °F (36.7 °C) (Oral)   Resp 21   Ht 5' 3\" (1.6 m)   Wt 159 lb 9.8 oz (72.4 kg)   SpO2 91%   BMI 28.27 kg/m²   Temp  Av.3 °F (37.4 °C)  Min: 98 °F (36.7 °C)  Max: 103.1 °F (39.5 °C)    Intake/Output Summary (Last 24 hours) at 10/1/2020 1154  Last data filed at 10/1/2020 0835  Gross per 24 hour   Intake 3560 ml   Output 1310 ml   Net 2250 ml       Exam: nad  abd soft  Urine clear on minimal cbi    Labs:  WBC:    Lab Results   Component Value Date    WBC 20.9 10/01/2020     Hemoglobin/Hematocrit:    Lab Results   Component Value Date    HGB 12.2 10/01/2020    HCT 36.5 10/01/2020     BMP:    Lab Results   Component Value Date     10/01/2020    K 4.0 10/01/2020     10/01/2020    CO2 19 10/01/2020    BUN 22 10/01/2020    LABALBU 4.4 2020    CREATININE 1.2 10/01/2020    CALCIUM 7.5 10/01/2020    GFRAA 56 10/01/2020    LABGLOM 47 10/01/2020     PT/INR:    Lab Results   Component Value Date    PROTIME 11.8 2020    INR 1.02 2020     PTT:  No results found for: APTT[APTT          Impression/Plan: pod #1 s/p left stent placement  -etiology of gross hematuria and clot is unclear  -cytology and cultures pending  -will need a second look ureteroscopy as outpatient in 1-2 weeks  -wbc rising. Will follow closely with you.     Haley Wagoner MD

## 2020-10-01 NOTE — CARE COORDINATION
Case Management Assessment           Initial Evaluation                Date / Time of Evaluation: 10/1/2020 2:45 PM                 Assessment Completed by: Flory Fry    Patient Name: Tavares Vora     YOB: 1965  Diagnosis: Flank pain [R10.9]  Flank pain [R10.9]  Flank pain [R10.9]     Date / Time: 9/29/2020  3:53 PM    Patient Admission Status: Inpatient    If patient is discharged prior to next notation, then this note serves as note for discharge by case management. Current PCP: Renetta Mcmillan MD  Clinic Patient: No    Chart Reviewed: Yes  Patient/ Family Interviewed: Yes    Initial assessment completed at bedside with: patient at bedside    Hospitalization in the last 30 days: No    Emergency Contacts:  Extended Emergency Contact Information  Primary Emergency Contact: Taz [address] Phone: [phone]  Relation: Parent    Advance Directives:   Code Status: Full [address]: No  Agent:   Contact Number:     Copy present: No     In paper Chart: No    Scanned into EMR No    Financial  Payor: Remberto Garcia / Plan: Lokesh Alfredo / Product Type: *No Product type* /     Pre-cert required for SNF: Yes    Pharmacy  No Pharmacies Listed    Potential assistance Purchasing Medications: Potential Assistance Purchasing Medications: No  Does Patient want to participate in local refill/ meds to beds program?: No    Meds To Beds General Rules:  1. Can ONLY be done Monday- Friday between 8:30am-5pm  2. Prescription(s) must be in pharmacy by 3pm to be filled same day  3. Copy of patient's insurance/ prescription drug card and patient face sheet must be sent along with the prescription(s)  4. Cost of Rx cannot be added to hospital bill. If financial assistance is needed, please contact unit  or ;  or  CANNOT provide pharmacy voucher for patients co-pays  5.  Patients can then  the prescription on their way out of the hospital at discharge, or pharmacy can deliver to the bedside if staff is available. (payment due at time of pick-up or delivery - cash, check, or card accepted)     Able to afford home medications/ co-pay costs: Yes    ADLS  Support Systems: Parent, Family Members, Friends/Neighbors    PT AM-PAC:   /24  OT AM-PAC:   /24    [place]: from home with disabled daughter  Steps: 5    Plans to RETURN to current housing: Yes  Barriers to RETURNING to current housing: none per patient    Osbaldo Wilson 78  Currently ACTIVE with [address]: No  Home Care Agency: Not Applicable    Currently ACTIVE with [place] on Aging: No  Passport/ Waiver: No  Passport/ Waiver Services: Not Applicable    :  Phone:       [address]  [facility] Provider: none per patient  Equipment:     Home Oxygen and [address] prior to admission: No  [address]: Not Applicable  Other Respiratory Equipment:     Informed of need to bring portable home O2 tank on day of DISCHARGE for nursing to connect prior to leaving: No  Verbalized agreement/Understanding: No  Person to bring portable tank at discharge:     Dialysis  Active with HD/PD prior to admission: No  Nephrologist:     HD Center:  Not Applicable    DISCHARGE PLAN:  Disposition: Home- No Services Needed    Transportation PLAN for discharge: family     Factors facilitating achievement of predicted outcomes: Family support, Cooperative, Pleasant and Good insight into deficits    Barriers to discharge: Pain, Medical complications and Medication managment    Additional Case Management Notes: CM met with patient at bedside, patient from home with her disabled 35year old daughter who is blind and non-verbal. Patient reports her daughter is being cared for by patients mother while patient here in the hospital. She reports that she also has services for her daughter through the [facility], but does not use any DME or have [place] [id number] for herself. Patient plans for return home and reports her nephew should be able to transport her to home at DC. She declines need for DME or HHC at DC, she reports that she usually bounces after these procedures. Patient reports that she works part time in a [employer] prior to admission. The Plan for Transition of Care is related to the following treatment goals of Flank pain [R10.9]  Flank pain [R10.9]  Flank pain [R10.9]    The Patient and/or patient representative Jaya Avilez and her family were provided with a choice of provider and agrees with the discharge plan Yes    Freedom of choice list was provided with basic dialogue that supports the patient's individualized plan of care/goals and shares the quality data associated with the providers.  Yes    Care Transition patient: No    Cuca Amezquita RN  [facility]  Case Management Department  Ph: [phone]   Fax: [phone]

## 2020-10-01 NOTE — PLAN OF CARE
Problem: Pain:  Goal: Pain level will decrease  Description: Pain level will decrease  10/1/2020 1701 by Ilsa Paris RN  Outcome: Ongoing     Problem: Pain:  Goal: Control of acute pain  Description: Control of acute pain  Outcome: Ongoing     Problem: SAFETY  Goal: Free from accidental physical injury  10/1/2020 1701 by Ilsa Paris RN  Outcome: Ongoing     Problem: Nausea/Vomiting:  Goal: Absence of nausea/vomiting  Description: Absence of nausea/vomiting  10/1/2020 1701 by Ilsa Paris RN  Outcome: Ongoing     Problem: Nausea/Vomiting:  Goal: Able to drink  Description: Able to drink  Outcome: Ongoing     Problem: Nausea/Vomiting:  Goal: Able to eat  Description: Able to eat  Outcome: Ongoing

## 2020-10-02 ENCOUNTER — APPOINTMENT (OUTPATIENT)
Dept: GENERAL RADIOLOGY | Age: 55
DRG: 720 | End: 2020-10-02
Payer: COMMERCIAL

## 2020-10-02 ENCOUNTER — APPOINTMENT (OUTPATIENT)
Dept: CT IMAGING | Age: 55
DRG: 720 | End: 2020-10-02
Payer: COMMERCIAL

## 2020-10-02 LAB
ANION GAP SERPL CALCULATED.3IONS-SCNC: 11 MMOL/L (ref 3–16)
BUN BLDV-MCNC: 15 MG/DL (ref 7–20)
CALCIUM SERPL-MCNC: 8.6 MG/DL (ref 8.3–10.6)
CHLORIDE BLD-SCNC: 103 MMOL/L (ref 99–110)
CO2: 20 MMOL/L (ref 21–32)
CREAT SERPL-MCNC: 0.7 MG/DL (ref 0.6–1.1)
D DIMER: 1600 NG/ML DDU (ref 0–229)
GFR AFRICAN AMERICAN: >60
GFR NON-AFRICAN AMERICAN: >60
GLUCOSE BLD-MCNC: 100 MG/DL (ref 70–99)
HCT VFR BLD CALC: 36 % (ref 36–48)
HEMOGLOBIN: 12 G/DL (ref 12–16)
MAGNESIUM: 1.9 MG/DL (ref 1.8–2.4)
MCH RBC QN AUTO: 32.5 PG (ref 26–34)
MCHC RBC AUTO-ENTMCNC: 33.2 G/DL (ref 31–36)
MCV RBC AUTO: 97.8 FL (ref 80–100)
PDW BLD-RTO: 14.4 % (ref 12.4–15.4)
PLATELET # BLD: 115 K/UL (ref 135–450)
PMV BLD AUTO: 9 FL (ref 5–10.5)
POTASSIUM REFLEX MAGNESIUM: 3.3 MMOL/L (ref 3.5–5.1)
PRO-BNP: 2730 PG/ML (ref 0–124)
RBC # BLD: 3.68 M/UL (ref 4–5.2)
SODIUM BLD-SCNC: 134 MMOL/L (ref 136–145)
TROPONIN: <0.01 NG/ML
WBC # BLD: 21.3 K/UL (ref 4–11)

## 2020-10-02 PROCEDURE — 6360000002 HC RX W HCPCS: Performed by: INTERNAL MEDICINE

## 2020-10-02 PROCEDURE — 71045 X-RAY EXAM CHEST 1 VIEW: CPT

## 2020-10-02 PROCEDURE — 6370000000 HC RX 637 (ALT 250 FOR IP): Performed by: INTERNAL MEDICINE

## 2020-10-02 PROCEDURE — 6360000004 HC RX CONTRAST MEDICATION: Performed by: UROLOGY

## 2020-10-02 PROCEDURE — 85379 FIBRIN DEGRADATION QUANT: CPT

## 2020-10-02 PROCEDURE — 80048 BASIC METABOLIC PNL TOTAL CA: CPT

## 2020-10-02 PROCEDURE — 2060000000 HC ICU INTERMEDIATE R&B

## 2020-10-02 PROCEDURE — 84484 ASSAY OF TROPONIN QUANT: CPT

## 2020-10-02 PROCEDURE — 2580000003 HC RX 258: Performed by: INTERNAL MEDICINE

## 2020-10-02 PROCEDURE — 71275 CT ANGIOGRAPHY CHEST: CPT

## 2020-10-02 PROCEDURE — 85027 COMPLETE CBC AUTOMATED: CPT

## 2020-10-02 PROCEDURE — 94761 N-INVAS EAR/PLS OXIMETRY MLT: CPT

## 2020-10-02 PROCEDURE — 83735 ASSAY OF MAGNESIUM: CPT

## 2020-10-02 PROCEDURE — 36415 COLL VENOUS BLD VENIPUNCTURE: CPT

## 2020-10-02 PROCEDURE — 83880 ASSAY OF NATRIURETIC PEPTIDE: CPT

## 2020-10-02 RX ORDER — POTASSIUM CHLORIDE 20 MEQ/1
40 TABLET, EXTENDED RELEASE ORAL PRN
Status: DISCONTINUED | OUTPATIENT
Start: 2020-10-02 | End: 2020-10-04 | Stop reason: HOSPADM

## 2020-10-02 RX ORDER — IPRATROPIUM BROMIDE AND ALBUTEROL SULFATE 2.5; .5 MG/3ML; MG/3ML
1 SOLUTION RESPIRATORY (INHALATION) EVERY 4 HOURS PRN
Status: DISCONTINUED | OUTPATIENT
Start: 2020-10-02 | End: 2020-10-04 | Stop reason: HOSPADM

## 2020-10-02 RX ORDER — POTASSIUM CHLORIDE 20 MEQ/1
40 TABLET, EXTENDED RELEASE ORAL
Status: COMPLETED | OUTPATIENT
Start: 2020-10-02 | End: 2020-10-02

## 2020-10-02 RX ORDER — LORAZEPAM 0.5 MG/1
0.5 TABLET ORAL 2 TIMES DAILY PRN
Status: DISCONTINUED | OUTPATIENT
Start: 2020-10-02 | End: 2020-10-04 | Stop reason: HOSPADM

## 2020-10-02 RX ORDER — POTASSIUM CHLORIDE 7.45 MG/ML
10 INJECTION INTRAVENOUS PRN
Status: DISCONTINUED | OUTPATIENT
Start: 2020-10-02 | End: 2020-10-04 | Stop reason: HOSPADM

## 2020-10-02 RX ORDER — IPRATROPIUM BROMIDE AND ALBUTEROL SULFATE 2.5; .5 MG/3ML; MG/3ML
1 SOLUTION RESPIRATORY (INHALATION)
Status: DISCONTINUED | OUTPATIENT
Start: 2020-10-02 | End: 2020-10-02

## 2020-10-02 RX ORDER — LORAZEPAM 2 MG/ML
1 INJECTION INTRAMUSCULAR ONCE
Status: COMPLETED | OUTPATIENT
Start: 2020-10-02 | End: 2020-10-02

## 2020-10-02 RX ADMIN — Medication 10 ML: at 23:28

## 2020-10-02 RX ADMIN — Medication 10 ML: at 10:19

## 2020-10-02 RX ADMIN — IOPAMIDOL 80 ML: 755 INJECTION, SOLUTION INTRAVENOUS at 14:52

## 2020-10-02 RX ADMIN — LORAZEPAM 1 MG: 2 INJECTION INTRAMUSCULAR; INTRAVENOUS at 15:52

## 2020-10-02 RX ADMIN — FAMOTIDINE 20 MG: 20 TABLET, FILM COATED ORAL at 10:18

## 2020-10-02 RX ADMIN — FAMOTIDINE 20 MG: 20 TABLET, FILM COATED ORAL at 20:50

## 2020-10-02 RX ADMIN — POTASSIUM CHLORIDE 40 MEQ: 1500 TABLET, EXTENDED RELEASE ORAL at 10:17

## 2020-10-02 RX ADMIN — LORAZEPAM 0.5 MG: 0.5 TABLET ORAL at 20:52

## 2020-10-02 RX ADMIN — IBUPROFEN 400 MG: 400 TABLET, FILM COATED ORAL at 19:06

## 2020-10-02 RX ADMIN — ONDANSETRON 4 MG: 2 INJECTION INTRAMUSCULAR; INTRAVENOUS at 15:52

## 2020-10-02 RX ADMIN — ENOXAPARIN SODIUM 40 MG: 40 INJECTION SUBCUTANEOUS at 10:18

## 2020-10-02 RX ADMIN — CEFTRIAXONE 1 G: 1 INJECTION, POWDER, FOR SOLUTION INTRAMUSCULAR; INTRAVENOUS at 10:19

## 2020-10-02 ASSESSMENT — PAIN SCALES - GENERAL
PAINLEVEL_OUTOF10: 2
PAINLEVEL_OUTOF10: 4
PAINLEVEL_OUTOF10: 0
PAINLEVEL_OUTOF10: 0
PAINLEVEL_OUTOF10: 8

## 2020-10-02 NOTE — PROGRESS NOTES
Hospitalist Progress Note      PCP: Veronica Rodriguez MD    Date of Admission: 9/29/2020    Subjective: Patient seen and examined   -She is complaining of shortness of breath today although she is on 2 L of oxygen, also complaining of pain in her neck headache  -Status post cystoscopy and left stent placement on 09/30  -WBC 21,000    Medications:  Reviewed    Infusion Medications     Scheduled Medications    potassium chloride  40 mEq Oral Daily with breakfast    cefTRIAXone (ROCEPHIN) IV  1 g Intravenous Q24H    nicotine  1 patch Transdermal Daily    sodium chloride flush  10 mL Intravenous 2 times per day    sennosides-docusate sodium  1 tablet Oral BID    famotidine  20 mg Oral BID    enoxaparin  40 mg Subcutaneous Daily     PRN Meds: potassium chloride **OR** potassium alternative oral replacement **OR** potassium chloride, ibuprofen, HYDROmorphone, sodium chloride flush, potassium chloride, magnesium sulfate, acetaminophen **OR** acetaminophen, magnesium hydroxide, promethazine **OR** ondansetron      Intake/Output Summary (Last 24 hours) at 10/2/2020 0828  Last data filed at 10/2/2020 0743  Gross per 24 hour   Intake 1285.42 ml   Output 3875 ml   Net -2589.58 ml       Physical Exam Performed:    BP (!) 164/104   Pulse 71   Temp 98.2 °F (36.8 °C) (Oral)   Resp 20   Ht 5' 3\" (1.6 m)   Wt 155 lb 10.3 oz (70.6 kg)   SpO2 96% Comment: NC for comfort  BMI 27.57 kg/m²     General appearance: No apparent distress, appears stated age and cooperative. On oxygen  HEENT: Pupils equal, round, and reactive to light. Conjunctivae/corneas clear. Neck: Supple, with full range of motion. No jugular venous distention. Trachea midline. Respiratory:  Normal respiratory effort. Clear to auscultation, bilaterally without Rales/Wheezes/Rhonchi. Cardiovascular: Regular rate and rhythm with normal S1/S2 without murmurs, rubs or gallops.   Abdomen: Soft, non-tender, non-distended with normal bowel sounds. Musculoskeletal: No clubbing, cyanosis or edema bilaterally. Full range of motion without deformity. Skin: Skin color, texture, turgor normal.  No rashes or lesions. Neurologic:  Neurovascularly intact without any focal sensory/motor deficits. Cranial nerves: II-XII intact, grossly non-focal.  Psychiatric: Alert and oriented, thought content appropriate, normal insight  Capillary Refill: Brisk,< 3 seconds   Peripheral Pulses: +2 palpable, equal bilaterally       Labs:   Recent Labs     09/30/20  0458 09/30/20  1605 10/01/20  0600 10/02/20  0446   WBC 16.5*  --  20.9* 21.3*   HGB 14.3 13.3 12.2 12.0   HCT 43.2 39.7 36.5 36.0     --  121* 115*     Recent Labs     09/30/20  0458 10/01/20  0600 10/02/20  0446    142 134*   K 3.7 4.0 3.3*    111* 103   CO2 24 19* 20*   BUN 22* 22* 15   CREATININE 1.0 1.2* 0.7   CALCIUM 8.9 7.5* 8.6     Recent Labs     09/29/20  1613   AST 31   ALT 38   BILITOT 0.3   ALKPHOS 97     Recent Labs     09/29/20  2204   INR 1.02     No results for input(s): CKTOTAL, TROPONINI in the last 72 hours. Urinalysis:      Lab Results   Component Value Date    NITRU POSITIVE 09/29/2020    WBCUA 3-5 09/29/2020    BACTERIA 4+ 09/29/2020    RBCUA >100 09/29/2020    BLOODU LARGE 09/29/2020    SPECGRAV 1.020 09/29/2020    GLUCOSEU Negative 09/29/2020       Radiology:  FL RETROGRADE PYELOGRAM W WO KUB   Final Result   1. Left hydroureteronephrosis. 2. Satisfactory stent placement. CT ABDOMEN PELVIS WO CONTRAST Additional Contrast? None   Final Result   1. Heterogeneous mixed attenuation density within the renal pelvis on the left extending toward the upper pole calyx. This results in moderate left hydronephrosis. Differential considerations favor transitional cell carcinoma or hemorrhage within the    collecting system. Urologic consultation is recommended along with further evaluation via ureteroscopy. 2. Additional nonobstructing bilateral renal calculi.    3. Stable left renal cyst.                 Assessment/Plan:    Active Hospital Problems    Diagnosis    Flank pain [R10.9]     # L flank pain with  L kidney mass, carcinoma vs. hemorrhage  -unclear etiology  -urology consulted, status post cystoscopy and left stent placement  -analgesia as needed    Hypotension :-Improved now , likely postop with anesthesia and postop pain control  With early sepsis  -continue iv abx  Iv fluids  Telemetry    UTI  On iv abx  cx pending  Consult urology,   Will need a second look  ureteroscopy as outpatient in 1 to 2 weeks    Acute shortness of breath:-With elevated d-dimer will obtain CTPA  BNP elevated  Wean down oxygen as tolerated    DVT Prophylaxis: scds  Diet: DIET GENERAL;  Code Status: Full Code    PT/OT Eval Status: n/a     Dispo - inpatient pending improvement    Ruslan Lemus MD

## 2020-10-02 NOTE — PLAN OF CARE
Problem: Pain:  Goal: Pain level will decrease  Description: Pain level will decrease  10/2/2020 1359 by Kuldeep Rodriguez RN  Outcome: Ongoing   Monitoring pain trend, implementing non-pharm interventions and also admins PRN pain medication. Will continue to monitor and maintain Pts goal for pain relief.       Problem: SAFETY  Goal: Free from accidental physical injury  10/2/2020 1359 by Kuldeep Rodriguez RN  Outcome: Ongoing     Problem: Nausea/Vomiting:  Goal: Absence of nausea/vomiting  Description: Absence of nausea/vomiting  10/2/2020 1359 by Kuldeep Rodriguez RN  Outcome: Ongoing

## 2020-10-02 NOTE — PROGRESS NOTES
Physician Progress Note      Vee Montero  CSN #:                  [id number]  :                       1965  ADMIT DATE:       2020 3:53 PM  [address] DATE:  RESPONDING  PROVIDER #:        Latoya Max MD          QUERY TEXT:    Dear Attending Provider,    Patient admitted with left flank pain. CT shown left kidney cystic mass.    Medical cytology pending . If possible, please document in progress notes and   discharge summary if you are evaluating and/or treating any of the following: The medical record reflects the following:  Risk Factors: 53 yo, Smoker  Clinical Indicators: Left flank pain. Hematuria. CT shown left cystic mass on   left kidney.  Cystoscopy, bilateral retrograde pyelogram,evacuation of clot from   bladder,  Left double-J stent  placed. Postoperative diagnosis of \" Left hydro   and possible urothelial carcinoma of the left collecting system.  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

## 2020-10-02 NOTE — CARE COORDINATION
Case Management Assessment           Daily Note                 Date/ Time of Note: 10/2/2020 2:09 PM         Note completed by: Jacques Crowell    Patient Name: Selina Bello  YOB: 1965    Diagnosis:Flank pain [R10.9]  Flank pain [R10.9]  Flank pain [R10.9]  Patient Admission Status: Inpatient    Date of Slim [place]  3:53 PM Length of Stay: 3 GLOS:      Current Plan of Care: wbc elevated, shortness of breath, CTA ordered, IV abx  ________________________________________________________________________________________  PT AM-PAC:   / 24 per last evaluation on: not ordered    OT AM-PAC:   / 24 per last evaluation on: not ordered    DME Needs for discharge:   ________________________________________________________________________________________  Discharge Plan: Home    Tentative discharge date: TBD    Current barriers to discharge: Medical and urological clearance for DC    Referrals completed: Not Applicable    Resources/ information provided: Not indicated at this time  ________________________________________________________________________________________  Case Management Notes: CM continues to follow for discharge planning and needs. Patient with elevated white count and increased shortness of breath today. CTA ordered. Patient continues on IV abx and urology following. CM will continue to follow for discharge planning. Stefan Fletcher and her family were provided with choice of provider; she and her family are in agreement with the discharge plan.     Care Transition Patient: Dominique Crowell RN  [facility]  Case Management Department  Ph: [phone]  Fax: [phone]

## 2020-10-02 NOTE — PLAN OF CARE
Problem: Pain:  Goal: Pain level will decrease  Description: Pain level will decrease  10/2/2020 0020 by Dana Alston RN  Outcome: Ongoing     Problem: Pain:  Goal: Control of acute pain  Description: Control of acute pain  10/2/2020 0020 by Dana Alston RN  Outcome: Ongoing     Problem: SAFETY  Goal: Free from accidental physical injury  10/2/2020 0020 by Dana Alston RN  Outcome: Ongoing     Problem: Nausea/Vomiting:  Goal: Absence of nausea/vomiting  Description: Absence of nausea/vomiting  10/2/2020 0020 by Dana Alston RN  Outcome: Ongoing     Problem: Nausea/Vomiting:  Goal: Able to drink  Description: Able to drink  10/2/2020 0020 by Dana Alston RN  Outcome: Ongoing     Problem: Nausea/Vomiting:  Goal: Able to eat  Description: Able to eat  10/2/2020 0020 by Dana Alston RN  Outcome: Ongoing     Problem: Nausea/Vomiting:  Goal: Ability to achieve adequate nutritional intake will improve  Description: Ability to achieve adequate nutritional intake will improve  10/2/2020 0020 by Dana Alston RN  Outcome: Ongoing

## 2020-10-02 NOTE — PROGRESS NOTES
Urology Attending Progress Note      Subjective: c/o headache, neck stiffness and sob    Vitals:  BP (!) 161/95   Pulse 82   Temp 98.1 °F (36.7 °C) (Oral)   Resp 28   Ht 5' 3\" (1.6 m)   Wt 155 lb 10.3 oz (70.6 kg)   SpO2 95%   BMI 27.57 kg/m²   Temp  Av.3 °F (36.8 °C)  Min: 98.1 °F (36.7 °C)  Max: 98.6 °F (37 °C)    Intake/Output Summary (Last 24 hours) at 10/2/2020 1332  Last data filed at 10/2/2020 1219  Gross per 24 hour   Intake 1405.42 ml   Output 3875 ml   Net -2469.58 ml       Exam: nad  abd soft  Urine clear    Labs:  WBC:    Lab Results   Component Value Date    WBC 21.3 10/02/2020     Hemoglobin/Hematocrit:    Lab Results   Component Value Date    HGB 12.0 10/02/2020    HCT 36.0 10/02/2020     BMP:    Lab Results   Component Value Date     10/02/2020    K 3.3 10/02/2020     10/02/2020    CO2 20 10/02/2020    BUN 15 10/02/2020    LABALBU 4.4 2020    CREATININE 0.7 10/02/2020    CALCIUM 8.6 10/02/2020    GFRAA >60 10/02/2020    LABGLOM >60 10/02/2020     PT/INR:    Lab Results   Component Value Date    PROTIME 11.8 2020    INR 1.02 2020     PTT:  No results found for: APTT[APTT    U      Impression/Plan: s/p cysto and stent placement for left hydro and clot in collecting system  -she has shortness of breath and headache, labs are pending  -her cr has improved to -0.7 and colic has resolved. However, I am concerned with her wbc and sob  -etiology of the hematuria is unknown.   -if wbc does not improve, we will likely need to repeat imaging with ct scan chest/abd/pelvis  -will follow w/ you.     Mar Alfonso MD

## 2020-10-02 NOTE — PROGRESS NOTES
Pt having increased anxiety and waiting to go down to CT. Pt asking if there is anything to take for her anxiety. Pt states feeling worse than earlier. VSS.  Notified MD.

## 2020-10-02 NOTE — PROGRESS NOTES
RESPIRATORY THERAPY ASSESSMENT    Name:  Star Interiano  Medical Record Number:  [id number]  Age: 54 y.o. Gender: female  : 1965  Today's Date:  10/2/2020  Room:  7673/7402-50    Assessment     Is the patient being admitted for a COPD or Asthma exacerbation? No   (If yes the patient will be seen every 4 hours for the first 24 hours and then reassessed)    Patient Admission Diagnosis      Allergies  No Known Allergies    Minimum Predicted Vital Capacity:               Actual Vital Capacity:                    Pulmonary History:Current smoker  Home Oxygen Therapy:  room air  Home Respiratory Therapy:None   Current Respiratory Therapy:  HHN Duoneb Q4w/a  Treatment Type: Treatment missed       Respiratory Severity Index(RSI)   Patients with orders for inhalation medications, oxygen, or any therapeutic treatment modality will be placed on Respiratory Protocol. They will be assessed with the first treatment and at least every 72 hours thereafter. The following severity scale will be used to determine frequency of treatment intervention.     Smoking History: Pulmonary Disease or Smoking History, Greater than 15 pack year = 2    Social History  Social History     Tobacco Use    Smoking status: Current Every Day Smoker     Packs/day: 1.00    Smokeless tobacco: Never Used   Substance Use Topics    Alcohol use: No    Drug use: Never       Recent Surgical History: None = 0  Past Surgical History  Past Surgical History:   Procedure Laterality Date     SECTION      CYSTOSCOPY INSERTION / REMOVAL STENT / STONE Left 2020    CYSTOSCOPY, BILATERAL RETROGRADE PYLOGRAM; ATTEMPTED LEFT URETEROSCOPY; EVACUATION OF CLOT FROM BLADDER; LEFT STENT PLACEMENT performed by Nitish Marshall MD at [address]       Level of Consciousness: Alert, Oriented, and Cooperative = 0    Level of Activity: Mostly sedentary, minimal walking = 2    Respiratory Pattern: Regular Pattern; RR 8-20 = 0    Breath Sounds: Diminshed slightly=1    Sputum   ,  ,    Cough: Strong, spontaneous, non-productive = 0    Vital Signs   BP (!) 155/91   Pulse 86   Temp 98.6 °F (37 °C) (Oral)   Resp 24   Ht 5' 3\" (1.6 m)   Wt 155 lb 10.3 oz (70.6 kg)   SpO2 94%   BMI 27.57 kg/m²   SPO2 (COPD values may differ): 90-91% on room air or greater than 92% on FiO2 24- 28% = 1    Peak Flow (asthma only): not applicable = 0    RSI: 5-6 = Q4hr PRN (every four hours as needed) for dyspnea        Plan       Goals: medication delivery and improve oxygenation    Patient/caregiver was educated on the proper method of use for Respiratory Care Devices:  Yes      Level of patient/caregiver understanding able to:   ? Verbalize understanding   ? Demonstrate understanding       ? Teach back        ? Needs reinforcement       ? No available caregiver               ? Other:     Response to education:  Good     Is patient being placed on Home Treatment Regimen? No     Does the patient have everything they need prior to discharge? NA     Comments: Pt in no distress and refusing HHN. Plan of Care: Continue HHN prn w/ Duoneb. Electronically signed by Dillon Gonsales RCP on 10/2/2020 at 5:26 PM    Respiratory Protocol Guidelines     1. Assessment and treatment by Respiratory Therapy will be initiated for medication and therapeutic interventions upon initiation of aerosolized medication. 2. Physician will be contacted for respiratory rate (RR) greater than 35 breaths per minute. Therapy will be held for heart rate (HR) greater than 140 beats per minute, pending direction from physician. 3. Bronchodilators will be administered via Metered Dose Inhaler (MDI) with spacer when the following criteria are met:  a. Alert and cooperative     b. HR < 140 bpm  c. RR < 30 bpm                d. Can demonstrate a 2-3 second inspiratory hold  4.  Bronchodilators will be administered via Hand Held Nebulizer JENELLE [facility]) to patients when ANY of the following criteria are met  a. Incognizant or uncooperative          b. Patients treated with HHN at Home        c. Unable to demonstrate proper use of MDI with spacer     d. RR > 30 bpm   5. Bronchodilators will be delivered via Metered Dose Inhaler (MDI), HHN, Aerogen to intubated patients on mechanical ventilation. 6. Inhalation medication orders will be delivered and/or substituted as outlined below. Aerosolized Medications Ordering and Administration Guidelines:    1. All Medications will be ordered by a physician, and their frequency and/or modality will be adjusted as defined by the patients Respiratory Severity Index (RSI) score. 2. If the patient does not have documented COPD, consider discontinuing anticholinergics when RSI is less than 9.  3. If the bronchospasm worsens (increased RSI), then the bronchodilator frequency can be increased to a maximum of every 4 hours. If greater than every 4 hours is required, the physician will be contacted. 4. If the bronchospasm improves, the frequency of the bronchodilator can be decreased, based on the patient's RSI, but not less than home treatment regimen frequency. 5. Bronchodilator(s) will be discontinued if patient has a RSI less than 9 and has received no scheduled or as needed treatment for 72  Hrs. Patients Ordered on a Mucolytic Agent:    1. Must always be administered with a bronchodilator. 2. Discontinue if patient experiences worsened bronchospasm, or secretions have lessened to the point that the patient is able to clear them with a cough. Anti-inflammatory and Combination Medications:    1. If the patient lacks prior history of lung disease, is not using inhaled anti-inflammatory medication at home, and lacks wheezing by examination or by history for at least 24 hours, contact physician for possible discontinuation.

## 2020-10-03 ENCOUNTER — APPOINTMENT (OUTPATIENT)
Dept: MRI IMAGING | Age: 55
DRG: 720 | End: 2020-10-03
Payer: COMMERCIAL

## 2020-10-03 LAB
ANION GAP SERPL CALCULATED.3IONS-SCNC: 13 MMOL/L (ref 3–16)
BUN BLDV-MCNC: 16 MG/DL (ref 7–20)
CALCIUM SERPL-MCNC: 9.1 MG/DL (ref 8.3–10.6)
CHLORIDE BLD-SCNC: 103 MMOL/L (ref 99–110)
CO2: 22 MMOL/L (ref 21–32)
CREAT SERPL-MCNC: 0.6 MG/DL (ref 0.6–1.1)
GFR AFRICAN AMERICAN: >60
GFR NON-AFRICAN AMERICAN: >60
GLUCOSE BLD-MCNC: 115 MG/DL (ref 70–99)
HCT VFR BLD CALC: 40.8 % (ref 36–48)
HEMOGLOBIN: 13.9 G/DL (ref 12–16)
LV EF: 58 %
LVEF MODALITY: NORMAL
MAGNESIUM: 2 MG/DL (ref 1.8–2.4)
MCH RBC QN AUTO: 32.8 PG (ref 26–34)
MCHC RBC AUTO-ENTMCNC: 34 G/DL (ref 31–36)
MCV RBC AUTO: 96.4 FL (ref 80–100)
PDW BLD-RTO: 13.8 % (ref 12.4–15.4)
PLATELET # BLD: 120 K/UL (ref 135–450)
PMV BLD AUTO: 8.6 FL (ref 5–10.5)
POTASSIUM REFLEX MAGNESIUM: 3.2 MMOL/L (ref 3.5–5.1)
RBC # BLD: 4.23 M/UL (ref 4–5.2)
SODIUM BLD-SCNC: 138 MMOL/L (ref 136–145)
WBC # BLD: 15.5 K/UL (ref 4–11)

## 2020-10-03 PROCEDURE — 74183 MRI ABD W/O CNTR FLWD CNTR: CPT

## 2020-10-03 PROCEDURE — 2060000000 HC ICU INTERMEDIATE R&B

## 2020-10-03 PROCEDURE — A9579 GAD-BASE MR CONTRAST NOS,1ML: HCPCS | Performed by: UROLOGY

## 2020-10-03 PROCEDURE — 80048 BASIC METABOLIC PNL TOTAL CA: CPT

## 2020-10-03 PROCEDURE — 6360000002 HC RX W HCPCS: Performed by: INTERNAL MEDICINE

## 2020-10-03 PROCEDURE — 6360000004 HC RX CONTRAST MEDICATION: Performed by: UROLOGY

## 2020-10-03 PROCEDURE — 85027 COMPLETE CBC AUTOMATED: CPT

## 2020-10-03 PROCEDURE — 2580000003 HC RX 258: Performed by: INTERNAL MEDICINE

## 2020-10-03 PROCEDURE — 83735 ASSAY OF MAGNESIUM: CPT

## 2020-10-03 PROCEDURE — 6370000000 HC RX 637 (ALT 250 FOR IP): Performed by: INTERNAL MEDICINE

## 2020-10-03 PROCEDURE — 36415 COLL VENOUS BLD VENIPUNCTURE: CPT

## 2020-10-03 PROCEDURE — 93306 TTE W/DOPPLER COMPLETE: CPT

## 2020-10-03 RX ADMIN — CEFTRIAXONE 1 G: 1 INJECTION, POWDER, FOR SOLUTION INTRAMUSCULAR; INTRAVENOUS at 08:46

## 2020-10-03 RX ADMIN — FAMOTIDINE 20 MG: 20 TABLET, FILM COATED ORAL at 20:07

## 2020-10-03 RX ADMIN — ENOXAPARIN SODIUM 40 MG: 40 INJECTION SUBCUTANEOUS at 08:47

## 2020-10-03 RX ADMIN — GADOTERIDOL 15 ML: 279.3 INJECTION, SOLUTION INTRAVENOUS at 18:46

## 2020-10-03 RX ADMIN — Medication 10 ML: at 08:48

## 2020-10-03 RX ADMIN — IBUPROFEN 400 MG: 400 TABLET, FILM COATED ORAL at 04:03

## 2020-10-03 RX ADMIN — FAMOTIDINE 20 MG: 20 TABLET, FILM COATED ORAL at 08:47

## 2020-10-03 RX ADMIN — SENNOSIDES AND DOCUSATE SODIUM 1 TABLET: 8.6; 5 TABLET ORAL at 20:07

## 2020-10-03 RX ADMIN — Medication 10 ML: at 20:07

## 2020-10-03 RX ADMIN — SENNOSIDES AND DOCUSATE SODIUM 1 TABLET: 8.6; 5 TABLET ORAL at 08:47

## 2020-10-03 RX ADMIN — POTASSIUM CHLORIDE 40 MEQ: 1500 TABLET, EXTENDED RELEASE ORAL at 05:24

## 2020-10-03 ASSESSMENT — PAIN DESCRIPTION - LOCATION: LOCATION: HEAD

## 2020-10-03 ASSESSMENT — PAIN SCALES - GENERAL
PAINLEVEL_OUTOF10: 0
PAINLEVEL_OUTOF10: 6

## 2020-10-03 ASSESSMENT — PAIN DESCRIPTION - PROGRESSION: CLINICAL_PROGRESSION: GRADUALLY IMPROVING

## 2020-10-03 ASSESSMENT — PAIN DESCRIPTION - DESCRIPTORS: DESCRIPTORS: HEADACHE

## 2020-10-03 ASSESSMENT — PAIN DESCRIPTION - PAIN TYPE: TYPE: ACUTE PAIN

## 2020-10-03 NOTE — PROGRESS NOTES
Urology Attending Progress Note      Subjective: no complaints    Vitals:  BP (!) 124/92   Pulse 91   Temp 98.1 °F (36.7 °C) (Oral)   Resp 18   Ht 5' 3\" (1.6 m)   Wt 148 lb 13 oz (67.5 kg)   SpO2 93%   BMI 26.36 kg/m²   Temp  Av.6 °F (37 °C)  Min: 98.1 °F (36.7 °C)  Max: 99.9 °F (37.7 °C)    Intake/Output Summary (Last 24 hours) at 10/3/2020 1230  Last data filed at 10/3/2020 0858  Gross per 24 hour   Intake 10 ml   Output 2500 ml   Net -2490 ml       Exam: nad  abd soft  Urine clear    Labs:  WBC:    Lab Results   Component Value Date    WBC 15.5 10/03/2020     Hemoglobin/Hematocrit:    Lab Results   Component Value Date    HGB 13.9 10/03/2020    HCT 40.8 10/03/2020     BMP:    Lab Results   Component Value Date     10/03/2020    K 3.2 10/03/2020     10/03/2020    CO2 22 10/03/2020    BUN 16 10/03/2020    LABALBU 4.4 2020    CREATININE 0.6 10/03/2020    CALCIUM 9.1 10/03/2020    GFRAA >60 10/03/2020    LABGLOM >60 10/03/2020     PT/INR:    Lab Results   Component Value Date    PROTIME 11.8 2020    INR 1.02 2020     PTT:  No results found for: APTT[APTT        Impression/Plan: 53 yo with left renal mass, blood in collecting system, s/p stent  -she is much better today and wbc is decreasing  -I have ordered mri abd to get a better look at renal mass  -possible home ?     Paulette Whitfield MD

## 2020-10-03 NOTE — PROGRESS NOTES
Hospitalist Progress Note      PCP: Richy Licona MD    Date of Admission: 9/29/2020    Subjective: Patient seen and examined   Feeling better today, off oxygen  Slept well last night   -Status post cystoscopy and left stent placement on 09/30  ctpa negative for PE, doppler pending  Discussed with Dr Derick Hassan at bedside, planning fo MRI kidney today      Medications:  Reviewed    Infusion Medications     Scheduled Medications    cefTRIAXone (ROCEPHIN) IV  1 g Intravenous Q24H    nicotine  1 patch Transdermal Daily    sodium chloride flush  10 mL Intravenous 2 times per day    sennosides-docusate sodium  1 tablet Oral BID    famotidine  20 mg Oral BID    enoxaparin  40 mg Subcutaneous Daily     PRN Meds: potassium chloride **OR** potassium alternative oral replacement **OR** potassium chloride, perflutren lipid microspheres, LORazepam, ipratropium-albuterol, HYDROmorphone, sodium chloride flush, magnesium sulfate, acetaminophen **OR** acetaminophen, magnesium hydroxide, promethazine **OR** ondansetron      Intake/Output Summary (Last 24 hours) at 10/3/2020 1522  Last data filed at 10/3/2020 0858  Gross per 24 hour   Intake 10 ml   Output 1800 ml   Net -1790 ml       Physical Exam Performed:    BP (!) 124/92   Pulse 91   Temp 98.1 °F (36.7 °C) (Oral)   Resp 18   Ht 5' 3\" (1.6 m)   Wt 148 lb 13 oz (67.5 kg)   SpO2 93%   BMI 26.36 kg/m²     General appearance: No apparent distress, appears stated age and cooperative. On oxygen  HEENT: Pupils equal, round, and reactive to light. Conjunctivae/corneas clear. Neck: Supple, with full range of motion. No jugular venous distention. Trachea midline. Respiratory:  Normal respiratory effort. Clear to auscultation, bilaterally without Rales/Wheezes/Rhonchi. Cardiovascular: Regular rate and rhythm with normal S1/S2 without murmurs, rubs or gallops. Abdomen: Soft, non-tender, non-distended with normal bowel sounds.   Musculoskeletal: No clubbing, cyanosis or moderate left hydronephrosis. Differential considerations favor transitional cell carcinoma or hemorrhage within the    collecting system. Urologic consultation is recommended along with further evaluation via ureteroscopy. 2. Additional nonobstructing bilateral renal calculi. 3. Stable left renal cyst.         VL Extremity Venous Bilateral    (Results Pending)   MRI ABDOMEN W WO CONTRAST    (Results Pending)           Assessment/Plan:    Active Hospital Problems    Diagnosis    Flank pain [R10.9]     1. L flank pain with L kidney mass, carcinoma vs. hemorrhage  -unclear etiology  -urology consulted, status post cystoscopy and left stent placement on 09.30  -analgesia as needed  MRI ordered for characterisation    2. Hypotension :-Improved now , likely postop with anesthesia and postop pain control  With early sepsis  -continue iv abx  Iv fluids  Telemetry    3. UTI  On iv abx  cx pending  Consult urology,   Will need a second look  ureteroscopy as outpatient in 1 to 2 weeks    4. Acute shortness of breath:-With elevated d-dimer    CTPA negative  BNP elevated  Wean down oxygen as tolerated    5.  Elevated d dimer  Doppler pending, hx of superficial thrombsis    DVT Prophylaxis: scds  Diet: DIET GENERAL;  Code Status: Full Code    PT/OT Eval Status: n/a     Dispo - inpatient , possible d/c tomorrow pending imaging     Bandar Arroyo MD

## 2020-10-03 NOTE — PLAN OF CARE
Problem: Pain:  Goal: Pain level will decrease  Description: Pain level will decrease  Outcome: Ongoing  Note: Pt c/o no pain throughout shift. Will continue to monitor. Problem: DISCHARGE BARRIERS  Goal: Patient's continuum of care needs are met  Outcome: Ongoing  Note: Pt hoped to go home soon. MD and urologist to bedside with RN to explain plan of care and measured taken to ensure optimal care. Pt has venous doppler of BLE and MRI of kidney test pending. Pt understands. Will continue to monitor.

## 2020-10-04 VITALS
WEIGHT: 147.71 LBS | DIASTOLIC BLOOD PRESSURE: 82 MMHG | OXYGEN SATURATION: 95 % | RESPIRATION RATE: 21 BRPM | HEIGHT: 63 IN | HEART RATE: 87 BPM | SYSTOLIC BLOOD PRESSURE: 115 MMHG | TEMPERATURE: 98 F | BODY MASS INDEX: 26.17 KG/M2

## 2020-10-04 LAB
ANION GAP SERPL CALCULATED.3IONS-SCNC: 12 MMOL/L (ref 3–16)
BLOOD CULTURE, ROUTINE: NORMAL
BUN BLDV-MCNC: 19 MG/DL (ref 7–20)
CALCIUM SERPL-MCNC: 8.9 MG/DL (ref 8.3–10.6)
CHLORIDE BLD-SCNC: 105 MMOL/L (ref 99–110)
CO2: 23 MMOL/L (ref 21–32)
CREAT SERPL-MCNC: 0.7 MG/DL (ref 0.6–1.1)
CULTURE, BLOOD 2: NORMAL
GFR AFRICAN AMERICAN: >60
GFR NON-AFRICAN AMERICAN: >60
GLUCOSE BLD-MCNC: 99 MG/DL (ref 70–99)
HCT VFR BLD CALC: 41.2 % (ref 36–48)
HEMOGLOBIN: 13.9 G/DL (ref 12–16)
MAGNESIUM: 1.6 MG/DL (ref 1.8–2.4)
MCH RBC QN AUTO: 32.3 PG (ref 26–34)
MCHC RBC AUTO-ENTMCNC: 33.8 G/DL (ref 31–36)
MCV RBC AUTO: 95.6 FL (ref 80–100)
PDW BLD-RTO: 14.2 % (ref 12.4–15.4)
PLATELET # BLD: 136 K/UL (ref 135–450)
PMV BLD AUTO: 8.7 FL (ref 5–10.5)
POTASSIUM REFLEX MAGNESIUM: 3.2 MMOL/L (ref 3.5–5.1)
RBC # BLD: 4.31 M/UL (ref 4–5.2)
SODIUM BLD-SCNC: 140 MMOL/L (ref 136–145)
WBC # BLD: 11.5 K/UL (ref 4–11)

## 2020-10-04 PROCEDURE — 80048 BASIC METABOLIC PNL TOTAL CA: CPT

## 2020-10-04 PROCEDURE — 36415 COLL VENOUS BLD VENIPUNCTURE: CPT

## 2020-10-04 PROCEDURE — 85027 COMPLETE CBC AUTOMATED: CPT

## 2020-10-04 PROCEDURE — 2580000003 HC RX 258: Performed by: INTERNAL MEDICINE

## 2020-10-04 PROCEDURE — 6370000000 HC RX 637 (ALT 250 FOR IP): Performed by: INTERNAL MEDICINE

## 2020-10-04 PROCEDURE — 6360000002 HC RX W HCPCS: Performed by: INTERNAL MEDICINE

## 2020-10-04 PROCEDURE — 83735 ASSAY OF MAGNESIUM: CPT

## 2020-10-04 RX ORDER — FAMOTIDINE 20 MG/1
20 TABLET, FILM COATED ORAL 2 TIMES DAILY
Qty: 60 TABLET | Refills: 3 | Status: SHIPPED | OUTPATIENT
Start: 2020-10-04 | End: 2022-10-05 | Stop reason: ALTCHOICE

## 2020-10-04 RX ORDER — NICOTINE 21 MG/24HR
1 PATCH, TRANSDERMAL 24 HOURS TRANSDERMAL DAILY
Qty: 30 PATCH | Refills: 3 | Status: SHIPPED | OUTPATIENT
Start: 2020-10-04 | End: 2022-10-05 | Stop reason: ALTCHOICE

## 2020-10-04 RX ORDER — POTASSIUM CHLORIDE 7.45 MG/ML
10 INJECTION INTRAVENOUS
Status: DISPENSED | OUTPATIENT
Start: 2020-10-04 | End: 2020-10-04

## 2020-10-04 RX ORDER — FUROSEMIDE 10 MG/ML
20 INJECTION INTRAMUSCULAR; INTRAVENOUS ONCE
Status: COMPLETED | OUTPATIENT
Start: 2020-10-04 | End: 2020-10-04

## 2020-10-04 RX ORDER — CIPROFLOXACIN 500 MG/1
500 TABLET, FILM COATED ORAL 2 TIMES DAILY
Qty: 20 TABLET | Refills: 0 | Status: SHIPPED | OUTPATIENT
Start: 2020-10-04 | End: 2020-10-14

## 2020-10-04 RX ADMIN — CEFTRIAXONE 1 G: 1 INJECTION, POWDER, FOR SOLUTION INTRAMUSCULAR; INTRAVENOUS at 09:14

## 2020-10-04 RX ADMIN — FUROSEMIDE 20 MG: 10 INJECTION, SOLUTION INTRAMUSCULAR; INTRAVENOUS at 09:14

## 2020-10-04 RX ADMIN — Medication 10 ML: at 09:15

## 2020-10-04 RX ADMIN — ENOXAPARIN SODIUM 40 MG: 40 INJECTION SUBCUTANEOUS at 09:14

## 2020-10-04 RX ADMIN — MAGNESIUM SULFATE HEPTAHYDRATE 1 G: 1 INJECTION, SOLUTION INTRAVENOUS at 10:06

## 2020-10-04 RX ADMIN — POTASSIUM BICARBONATE 40 MEQ: 782 TABLET, EFFERVESCENT ORAL at 07:08

## 2020-10-04 RX ADMIN — FAMOTIDINE 20 MG: 20 TABLET, FILM COATED ORAL at 09:14

## 2020-10-04 RX ADMIN — POTASSIUM CHLORIDE 10 MEQ: 7.46 INJECTION, SOLUTION INTRAVENOUS at 11:24

## 2020-10-04 RX ADMIN — LORAZEPAM 0.5 MG: 0.5 TABLET ORAL at 00:04

## 2020-10-04 RX ADMIN — SENNOSIDES AND DOCUSATE SODIUM 1 TABLET: 8.6; 5 TABLET ORAL at 09:14

## 2020-10-04 RX ADMIN — MAGNESIUM SULFATE HEPTAHYDRATE 1 G: 1 INJECTION, SOLUTION INTRAVENOUS at 11:24

## 2020-10-04 RX ADMIN — POTASSIUM CHLORIDE 10 MEQ: 7.46 INJECTION, SOLUTION INTRAVENOUS at 10:07

## 2020-10-04 ASSESSMENT — PAIN SCALES - GENERAL
PAINLEVEL_OUTOF10: 0

## 2020-10-04 NOTE — PROGRESS NOTES
Urology Attending Progress Note      Subjective: feels ill today    Vitals:  /86   Pulse 83   Temp 97.7 °F (36.5 °C) (Oral)   Resp 22   Ht 5' 3\" (1.6 m)   Wt 147 lb 11.3 oz (67 kg)   SpO2 95%   BMI 26.17 kg/m²   Temp  Av.1 °F (36.7 °C)  Min: 97.7 °F (36.5 °C)  Max: 98.3 °F (36.8 °C)    Intake/Output Summary (Last 24 hours) at 10/4/2020 0841  Last data filed at 10/4/2020 0003  Gross per 24 hour   Intake 720 ml   Output 1550 ml   Net -830 ml       Exam: nad  abd soft  Urine clear    Labs:  WBC:    Lab Results   Component Value Date    WBC 11.5 10/04/2020     Hemoglobin/Hematocrit:    Lab Results   Component Value Date    HGB 13.9 10/04/2020    HCT 41.2 10/04/2020     BMP:    Lab Results   Component Value Date     10/04/2020    K 3.2 10/04/2020     10/04/2020    CO2 23 10/04/2020    BUN 19 10/04/2020    LABALBU 4.4 2020    CREATININE 0.7 10/04/2020    CALCIUM 8.9 10/04/2020    GFRAA >60 10/04/2020    LABGLOM >60 10/04/2020     PT/INR:    Lab Results   Component Value Date    PROTIME 11.8 2020    INR 1.02 2020     PTT:  No results found for: APTT[APTT      Urine Culture:  ngtd    Blood Culture:  ngtd    Antibiotic Therapy:  rocephin    Imaging: mri pending      Impression/Plan: 53 yo with left sided renal mass/cyst plus clot in collecting system s/p left stent  -hg stable, wbc now normal at 11.5  -she is on day 5 of rocephin  -I have reviewed MRI, but official read is pending and may not be back until Monday  - plan: f/u MRI, left ureteroscopy in 1-2 weeks as outpatient. Ok to d/c from [facility], but she wants to discuss with medicine. Needs at total of 7-10 day atb course, consider transitioning to po if pt going home.      Jaiden Davis MD

## 2020-10-04 NOTE — CARE COORDINATION
Case Management Assessment            Discharge Note                    Date / Time of Note: 10/4/2020 11:31 AM                  Discharge Note Completed by: Sarah Ayon    Patient Name: Lin Interiano   YOB: 1965  Diagnosis: Flank pain [R10.9]  Flank pain [R10.9]  Flank pain [R10.9]   Date / Time: 9/29/2020  3:53 PM    Current PCP: Mor Robles MD  Clinic patient: No    Hospitalization in the last 30 days: No    Advance Directives:  Code Status: Full Code  [place] DNR form completed and on chart: No    Financial:  Payor: Qasim Gonzalez / Plan: Jewel Zaragoza / Product Type: *No Product type* /      Pharmacy:    Y[facility]ott [phone]  [facility]  [address] [address]  Phone: [phone] Fax: [phone]      Assistance purchasing medications?: Potential Assistance Purchasing Medications: No  Assistance provided by Case Management: None at this time    Does patient want to participate in local refill/ meds to beds program?: No    Meds To Beds General Rules:  1. Can ONLY be done Monday- Friday between 8:30am-5pm  2. Prescription(s) must be in pharmacy by 3pm to be filled same day  3. Copy of patient's insurance/ prescription drug card and patient face sheet must be sent along with the prescription(s)  4. Cost of Rx cannot be added to hospital bill. If financial assistance is needed, please contact unit  or ;  or  CANNOT provide pharmacy voucher for patients co-pays  5.  Patients can then  the prescription on their way out of the hospital at discharge, or pharmacy can deliver to the bedside if staff is available. (payment due at time of pick-up or delivery - cash, check, or card accepted)     Able to afford home medications/ co-pay costs: Yes    ADLS:  Current PT AM-PAC Score:   /24  Current OT AM-PAC Score:   /24      DISCHARGE Disposition: Home- No Services Needed    LOC at discharge: Not Applicable  [place] Completed: Not Indicated    Notification completed in HENS/PAS?:  Not Applicable    IMM Completed:   Not Indicated    Transportation:  Transportation PLAN for discharge: family   Mode of Transport: [address] ordered at discharge: Not [address]: Not Applicable  Orders faxed: No    Durable Medical Equipment:  DME Provider: none  Equipment obtained during hospitalization:     Home Oxygen and Respiratory Equipment:  Oxygen needed at discharge?: Not [address]: Not Applicable  Portable tank available for discharge?: Not Indicated    Dialysis:  Dialysis patient: No    Dialysis Center:  Not Applicable      Additional CM Notes: PT from home with daughter, no needs at DC will DC on PO abx and f/u with Gu out pt    The Plan for Transition of Care is related to the following treatment goals of Flank pain [R10.9]  Flank pain [R10.9]  Flank pain [R10.9]    The Patient and/or patient representative Maryann Bright and her family were provided with a choice of provider and agrees with the discharge plan Yes    Freedom of choice list was provided with basic dialogue that supports the patient's individualized plan of care/goals and shares the quality data associated with the providers.  Not Indicated    Care Transitions patient: No    Lena Elizondo RN  [facility]  Case Management Department  Ph: [phone]  Fax: [phone]

## 2020-10-04 NOTE — PLAN OF CARE
Problem: Pain:  Goal: Control of acute pain  Description: Control of acute pain  Outcome: Met This Shift  Note: Pt rates pain 0/10. PRN medications available. Will monitor.         Problem: Nausea/Vomiting:  Goal: Able to drink  Description: Able to drink  Outcome: Met This Shift  Goal: Able to eat  Description: Able to eat  Outcome: Met This Shift

## 2020-10-04 NOTE — PROGRESS NOTES
Discharge note: Patient has been seen by doctor. Discharge order obtained, and discharge instructions reviewed. Patient educated, using the teach back method, about follow up instructions and discharge instructions. A completed copy of the AVS instructions given to patient and all questions answered. IV catheter removed without complaints, catheter intact, site WNL. Discharged to [facility] via wheel chair per transportation. Pt to home with friend via private vehicle.    Electronically signed by Lana Borrego RN on 10/4/2020 at 4:52 PM

## 2020-10-04 NOTE — DISCHARGE SUMMARY
Hospital Discharge Summary    Patient's PCP: Rere Anaya MD  Admit Date: 9/29/2020   Discharge Date: 10/4/2020    Admitting Physician: Dr. Pierre Pascual MD  Discharge Physician: Dr. Keaton Foster:   IP Freida Costa TO HOSPITALIST  IP CONSULT TO CARDIOLOGY    Brief HPI: 54 y.o. female who presented to [facility]. with pain in left flank, that is severe, a/w dark brown/urine. Has a history of large mass/cyst on the left kidney that until now the etiology has not been elucidated. Brief hospital course:   1. L flank pain with L kidney mass, carcinoma vs. Hemorrhage, unclear etiology  -urology consulted, status post cystoscopy and left stent placement on 09/ 30  MRI ordered for characterisation, results pending  At d/c  -- plan: f/u MRI, left ureteroscopy in 1-2 weeks as outpatient. 2. Hypotension :-Improved now , likely postop with anesthesia and postop pain control  Improved ta d/c      3. UTI  On iv abx  On po abx at d/ c  Consult urology,   Will need a second look  ureteroscopy as outpatient in 1 to 2 weeks     4. Acute shortness of breath:-With elevated d-dimer , CTPA negative  BNP elevated, treated  with iv lasix once. On room air at d/c  Echo unremarkable     5. Elevated d dimer, CTPA negative for PE, patient asymptomatic  - hx of superficial thrombsis  -dopplers ordered as OP, as she wants to go home to take care of her special need daughter      Invasive procedures:  Cystoscopy and left stent     Discharge Diagnoses: Active Problems:    Flank pain  Resolved Problems:    * No resolved hospital problems. *      Physical Exam: /82   Pulse 87   Temp 98 °F (36.7 °C) (Oral)   Resp 21   Ht 5' 3\" (1.6 m)   Wt 147 lb 11.3 oz (67 kg)   SpO2 95%   BMI 26.17 kg/m²   Gen/overall appearance: Not in acute distress. Alert.   Head: Normocephalic, atraumatic  Eyes: EOMI, good acuity  ENT:- Oral mucosa moist  Neck: No JVD, thyromegaly  CVS: Nml S1S2, no MRG, RRR  Pulm: Clear bilaterally. No crackles/wheezes  Gastrointestinal: Soft, NT/ND, +BS  Musculoskeletal: No edema. Warm  Neuro: No focal deficit. Moves extremity spontaneously. Psychiatry: Appropriate affect. Not agitated. Skin: Warm, dry with normal turgor. No rash        Significant diagnostic studies that may require follow up:  Ct Abdomen Pelvis Wo Contrast Additional Contrast? None    Result Date: 9/29/2020  EXAM: CT Abdomen and Pelvis without Contrast INDICATION: L flank pain COMPARISON: November 2, 2018 TECHNIQUE: Multiplanar reformatted images of the abdomen and pelvis are provided for review. Up-to-date CT equipment and radiation dose reduction techniques were employed. IV Contrast: None. Oral Contrast: No. FINDINGS: Lung Bases: Clear. Liver: Mildly decreased attenuation of the hepatic parenchyma is noted. No focal hepatic lesion. Gallbladder and Biliary Tree: No calcified gallstones. Normal wall thickness. No intra- or extrahepatic biliary dilatation. Pancreas: Normal. Spleen: Normal. Adrenal Glands: Normal. Kidneys and Ureters: Low-density renal cortical lesion on the left is again noted measuring up to 5.60 m. There is moderate left hydronephrosis. Mixed density is noted with hyperattenuating areas of the left renal pelvis extending toward the upper pole calyx. This is new from the prior study. No hydroureter or obstructing ureteral calculus on the left. Punctate nonobstructing bilateral renal calculi are noted. Urinary Bladder: Normal. Bowel: Normal diameter, nonobstructed. A few scattered colonic diverticula are noted. No diverticulitis. Reproductive Organs: No associated masses. Lymph Nodes: No abnormally enlarged nodes. Peritoneum/Retroperitoneum: No ascites or free air. Vessels: Aorta and IVC without significant abnormality. Abdominal Wall: Normal. Bones: No destructive osseous lesion. Mild superior endplate height loss of the T12 vertebral body is chronic. Other Findings: None.      1. Heterogeneous mixed attenuation density within the renal pelvis on the left extending toward the upper pole calyx. This results in moderate left hydronephrosis. Differential considerations favor transitional cell carcinoma or hemorrhage within the collecting system. Urologic consultation is recommended along with further evaluation via ureteroscopy. 2. Additional nonobstructing bilateral renal calculi. 3. Stable left renal cyst.     Mri Abdomen W Wo Contrast    Result Date: 10/4/2020  MRI abdomen with and without contrast. Indication: Left renal mass. Comparison study: Retrograde pyelogram from 9/30/2020. CT abdomen and pelvis from 9/29/2020. CT abdomen and pelvis from 11/2/2018. Procedure comments: Prior to and after intravenous administration of ProHance, multiplanar multi sequential MR images of the abdomen were obtained using renal protocol. Findings: Mild drop-off in liver between in and out of phase images consistent with diffuse hepatic steatosis. Punctate nonenhancing T2 hyperintense hepatic lesions consistent with cysts. Normal gallbladder. Normal caliber common duct. Normal pancreas. Tiny nonenhancing splenic lesions likely cysts. Normal adrenal glands. Tiny bilateral renal cysts. 5.4 x 5.3 cm right renal parenchymal simple cyst on series 7 image 12. Mildly dilated upper pole left renal calyces along the margin of the upper pole cyst representative series 9 image 9 with partial volume averaging on some of the images resulting in the appearance of thickened septations between the renal cyst and the adjacent dilated calyces representative series 4 image 29. Generalized smooth wall thickening and increased urothelial enhancement of the left renal collecting system and visualized portions of the left ureter representative series 16 image 38 and series 16 image 66. Mild dilatation of the left renal pelvis. No discrete nodular urothelial lesion seen. No suspicious renal parenchymal lesion.  Atherosclerotic plaque aorta. No retroperitoneal or mesenteric lymphadenopathy. No perinephric nodules. Trace bilateral pleural effusions with slight dependent atelectasis left lung base. Mild degenerative change spine. Impression: 1. Long segment urothelial thickening and increased urothelial enhancement of the left renal collecting system and the left ureter which could represent inflammatory etiology or upper urinary tract infection. 2. Mild dilatation of the left renal pelvis and upper pole left renal calyces. 3. Upper pole left renal simple cyst. 4. Tiny hepatic, splenic, and renal cysts. 5. Diffuse hepatic steatosis. 6. Trace bilateral pleural effusions with dependent consolidation in the left lung base. Fl Retrograde Pyelogram W Wo Kub    Result Date: 9/30/2020  Bilateral retrograde pyelogram INDICATIONS: Kidney stones. Left hydronephrosis Intraoperative supervising Physician: Rudi Bello Fluoroscopy time: 39 seconds Number of images: 12 FINDINGS: Bilateral cystoscopic catheterization of the ureter, right followed by left. Satisfactory position and placement of double-J left internal ureteral stent. FINDINGS: The right renal collecting system and calyces are normal. Ureter is unremarkable. Clubbing of the left renal collecting system with mild hydroureteronephrosis. Double-J internal ureteral stent in satisfactory position. 1. Left hydroureteronephrosis. 2. Satisfactory stent placement. Xr Chest Portable    Result Date: 10/2/2020  Chest portable 0953 HISTORY: Short of breath     Mild prominence of interstitial markings is without change. No focal consolidation. Stable cardiomediastinal silhouette noting mildly tortuous descending thoracic aorta. Cta Pulmonary W Contrast    Result Date: 10/2/2020  CT angiography chest with contrast HISTORY: Short of breath. Elevated d-dimer. COMPARISON: None CONTRAST:  80 mL Isovue-370 intravenous utilizing a PE protocol with MIP reconstructions.   TECHNIQUE: Individualized dose optimization technique was used in order to meet ALARA standards for radiation dose reduction. In addition to vendor specific dose reduction algorithms, the dose reduction techniques vary based on the specific scanner utilized but frequently include automated exposure control, adjustment of the mA and/or kV according to patient size, and use of iterative reconstruction technique. COMMENTS: Degenerative changes in the spine with mild scoliosis. Chronic appearing superior endplate compression fracture of T12. 5.5 cm left renal cyst is noted. Mediastinal structures are unremarkable without lymphadenopathy. There is some limitation in evaluation of the lower lobe pulmonary arteries due to motion artifact. No acute PE is seen. There is subpleural atelectasis in the left lower lobe associated with a small left pleural effusion. There is severe emphysema. No acute PE. Small left pleural effusion with associated left lower lobe atelectasis. Treatments: As above.       Discharge Medications:     Medication List      START taking these medications    ciprofloxacin 500 MG tablet  Commonly known as:  CIPRO  Take 1 tablet by mouth 2 times daily for 10 days     famotidine 20 MG tablet  Commonly known as:  PEPCID  Take 1 tablet by mouth 2 times daily     nicotine 14 MG/24HR  Commonly known as:  NICODERM CQ  Place 1 patch onto the skin daily           Where to Get Your Medications      [facility] [employer] [phone]  [address] [place] [address]    Phone:  [phone]   · ciprofloxacin 500 MG tablet  · famotidine 20 MG tablet  · nicotine 14 MG/24HR         Activity: activity as tolerated  Diet: DIET GENERAL;      Disposition: home  Discharged Condition: Stable  Follow Up:   Jewell Mosquera MD  [address] [place] [address] [place] [address] [phone]    In 1 week  for f/u on doppler of legs         Code status:  Full Code         Total time spent on discharge, finalizing medications, referrals and arranging outpatient follow up was more than 45 minutes      Thank you Dr. Annalisa Garsia MD for the opportunity to be involved in this patients care.

## 2020-11-09 ENCOUNTER — HOSPITAL ENCOUNTER (EMERGENCY)
Age: 55
Discharge: HOME | End: 2020-11-09
Attending: EMERGENCY MEDICINE
Payer: COMMERCIAL

## 2020-11-09 ENCOUNTER — APPOINTMENT (OUTPATIENT)
Dept: VASCULAR LAB | Age: 55
End: 2020-11-09
Payer: COMMERCIAL

## 2020-11-09 VITALS
HEART RATE: 72 BPM | SYSTOLIC BLOOD PRESSURE: 133 MMHG | DIASTOLIC BLOOD PRESSURE: 78 MMHG | BODY MASS INDEX: 26.05 KG/M2 | WEIGHT: 147 LBS | RESPIRATION RATE: 20 BRPM | TEMPERATURE: 99.3 F | HEIGHT: 63 IN | OXYGEN SATURATION: 98 %

## 2020-11-09 PROCEDURE — 99283 EMERGENCY DEPT VISIT LOW MDM: CPT

## 2020-11-09 PROCEDURE — 93971 EXTREMITY STUDY: CPT

## 2020-11-09 PROCEDURE — 6370000000 HC RX 637 (ALT 250 FOR IP): Performed by: STUDENT IN AN ORGANIZED HEALTH CARE EDUCATION/TRAINING PROGRAM

## 2020-11-09 RX ORDER — ACETAMINOPHEN 500 MG
1000 TABLET ORAL ONCE
Status: COMPLETED | OUTPATIENT
Start: 2020-11-09 | End: 2020-11-09

## 2020-11-09 RX ADMIN — ACETAMINOPHEN 1000 MG: 500 TABLET ORAL at 16:00

## 2020-11-09 ASSESSMENT — PAIN DESCRIPTION - DESCRIPTORS: DESCRIPTORS: ACHING

## 2020-11-09 ASSESSMENT — PAIN DESCRIPTION - FREQUENCY: FREQUENCY: CONTINUOUS

## 2020-11-09 ASSESSMENT — ENCOUNTER SYMPTOMS
SORE THROAT: 0
ABDOMINAL PAIN: 0
BACK PAIN: 0
NAUSEA: 0
VOMITING: 0
SHORTNESS OF BREATH: 0

## 2020-11-09 ASSESSMENT — PAIN DESCRIPTION - PAIN TYPE: TYPE: ACUTE PAIN

## 2020-11-09 ASSESSMENT — PAIN SCALES - GENERAL
PAINLEVEL_OUTOF10: 7
PAINLEVEL_OUTOF10: 7

## 2020-11-09 ASSESSMENT — PAIN DESCRIPTION - LOCATION: LOCATION: GROIN

## 2020-11-09 ASSESSMENT — PAIN DESCRIPTION - ORIENTATION: ORIENTATION: LEFT

## 2020-11-09 NOTE — ED PROVIDER NOTES
[address] [facility] RESIDENT NOTE       Date of evaluation: 2020    Chief Complaint     Groin Pain (since midnight yesterday pain to left groin down into left knee, -injury, two weeks ago had stent from left side removed but not sure what it was there for, hx superficial clot)      History of Present Illness     Jaya Interiano is a 54 y.o. female past medical history of superficial blood clots and left ureter stent s/p removal 2 weeks ago who presents for a tingling stabbing sensation of her left upper leg. The pain radiates to her left knee. Her knee itself does not hurt. The patient has had this pain for the last 24 hours, and it has been worsening ever since it started. The pain was worse today while at work. The patient has noticed no improvement or worsening with standing, moving her leg. She has had no urinary complaints. She has no skin changes, wounds to the area, or swelling. She has never had that this before. She did have a superficial clot to her left upper leg, which has resolved. She also had a left ureter stent removed 2 weeks ago, but no complications at the time. Review of Systems     Review of Systems   Constitutional: Negative for fever. HENT: Negative for congestion and sore throat. Eyes: Negative for visual disturbance. Respiratory: Negative for shortness of breath. Cardiovascular: Negative for chest pain. Gastrointestinal: Negative for abdominal pain, nausea and vomiting. Genitourinary: Negative for difficulty urinating. Musculoskeletal: Negative for arthralgias, back pain, gait problem and joint swelling. Neurological: Positive for numbness. Negative for dizziness and weakness. Psychiatric/Behavioral: Negative for confusion. Past Medical, Surgical, Family, and Social History     She has no past medical history on file.   She has a past surgical history that includes  section and CYSTOSCOPY INSERTION / REMOVAL STENT / STONE (Left, 9/30/2020). Her family history is not on file. She reports that she has been smoking. She has been smoking about 1.00 pack per day. She has never used smokeless tobacco. She reports that she does not drink alcohol or use drugs. Medications     Previous Medications    FAMOTIDINE (PEPCID) 20 MG TABLET    Take 1 tablet by mouth 2 times daily    NICOTINE (NICODERM CQ) 14 MG/24HR    Place 1 patch onto the skin daily       Allergies     She has No Known Allergies. Physical Exam     INITIAL VITALS: BP: 133/78, Temp: 99.3 °F (37.4 °C), Pulse: 72, Resp: 20, SpO2: 98 %   Physical Exam  Vitals signs reviewed. Constitutional:       General: She is not in acute distress. HENT:      Head: Normocephalic and atraumatic. Right Ear: External ear normal.      Left Ear: External ear normal.      Nose: Nose normal.      Mouth/Throat:      Mouth: Mucous membranes are moist.      Pharynx: Oropharynx is clear. Eyes:      Extraocular Movements: Extraocular movements intact. Conjunctiva/sclera: Conjunctivae normal.   Neck:      Musculoskeletal: Normal range of motion and neck supple. Cardiovascular:      Rate and Rhythm: Normal rate and regular rhythm. Pulses: Normal pulses. Pulmonary:      Effort: Pulmonary effort is normal. No respiratory distress. Breath sounds: Normal breath sounds. Abdominal:      General: Abdomen is flat. There is no distension. Palpations: Abdomen is soft. Tenderness: There is no abdominal tenderness. Musculoskeletal:         General: Tenderness (Mild tenderness to superior inner thigh.) present. No swelling or deformity. Comments: Full strength and range of motion of bilateral upper and lower extremities. No pain with movement of left hip, knee, or ankle. No effusion to left knee, no overlying skin changes to left knee or left thigh. Skin:     General: Skin is warm and dry.       Capillary Refill: Capillary refill takes less than 2 seconds. Findings: No bruising, erythema, lesion or rash. Neurological:      General: No focal deficit present. Mental Status: She is alert and oriented to person, place, and time. Cranial Nerves: No cranial nerve deficit. Motor: No weakness. Gait: Gait normal.   Psychiatric:         Mood and Affect: Mood normal.         Behavior: Behavior normal.         DiagnosticResults       RADIOLOGY:  VL Extremity Venous Left             LABS:   No results found for this visit on 11/09/20. ED BEDSIDE ULTRASOUND:  None    RECENT VITALS:  BP: 133/78, Temp: 99.3 °F (37.4 °C), Pulse: 72,Resp: 20, SpO2: 98 %     Procedures     None    ED Course     Nursing Notes, Past Medical Hx, Past Surgical Hx, Social Hx, Allergies, and Family Hx were reviewed. The patient was given the followingmedications:  Orders Placed This Encounter   Medications    acetaminophen (TYLENOL) tablet 1,000 mg       CONSULTS:  None    MEDICAL DECISION MAKING / ASSESSMENT / Aspen Kristie is a 54 y.o. female presenting for tingling sensation to her left inner thigh. Vital signs stable. Physical exam shows mild tenderness to palpation of left upper inner thigh. No evidence of any skin involvement, including no rash, wounds, lesions, erythema, or swelling. No point tenderness with palpation, no injury, making bony involvement highly unlikely. Patient is able to ambulate without any issues. No involvement of her left knee or hip. Patient given Tylenol for pain and heat pack for the area. Lower extremity ultrasound shows no superficial or deep blood clot. On reassessment, patient has full resolution of symptoms. She is able to move and ambulate without any concerns. Patient is requesting discharge. At this time, patient deemed appropriate for discharge. All of patient's questions answered. Patient will follow up with PCP tomorrow. Patient given return precautions to the ED.   Patient discharged in stable

## 2020-12-07 ENCOUNTER — HOSPITAL ENCOUNTER (OUTPATIENT)
Dept: CT IMAGING | Age: 55
Discharge: HOME | End: 2020-12-07
Payer: COMMERCIAL

## 2020-12-07 PROCEDURE — 6360000004 HC RX CONTRAST MEDICATION: Performed by: UROLOGY

## 2020-12-07 PROCEDURE — 74178 CT ABD&PLV WO CNTR FLWD CNTR: CPT

## 2020-12-07 RX ADMIN — IOPAMIDOL 80 ML: 755 INJECTION, SOLUTION INTRAVENOUS at 13:58

## 2021-06-12 ENCOUNTER — APPOINTMENT (OUTPATIENT)
Dept: CT IMAGING | Age: 56
End: 2021-06-12
Payer: COMMERCIAL

## 2021-06-12 ENCOUNTER — HOSPITAL ENCOUNTER (EMERGENCY)
Age: 56
Discharge: HOME | End: 2021-06-12
Attending: EMERGENCY MEDICINE
Payer: COMMERCIAL

## 2021-06-12 VITALS
TEMPERATURE: 97.8 F | HEIGHT: 63 IN | SYSTOLIC BLOOD PRESSURE: 173 MMHG | WEIGHT: 154 LBS | BODY MASS INDEX: 27.29 KG/M2 | DIASTOLIC BLOOD PRESSURE: 94 MMHG | HEART RATE: 81 BPM | RESPIRATION RATE: 20 BRPM | OXYGEN SATURATION: 100 %

## 2021-06-12 DIAGNOSIS — N20.0 KIDNEY STONE: ICD-10-CM

## 2021-06-12 DIAGNOSIS — N39.0 URINARY TRACT INFECTION WITH HEMATURIA, SITE UNSPECIFIED: Primary | ICD-10-CM

## 2021-06-12 DIAGNOSIS — R31.9 URINARY TRACT INFECTION WITH HEMATURIA, SITE UNSPECIFIED: Primary | ICD-10-CM

## 2021-06-12 LAB
ANION GAP SERPL CALCULATED.3IONS-SCNC: 13 MMOL/L (ref 3–16)
BACTERIA: ABNORMAL /HPF
BASOPHILS ABSOLUTE: 0 K/UL (ref 0–0.2)
BASOPHILS RELATIVE PERCENT: 0.3 %
BILIRUBIN URINE: NEGATIVE
BLOOD, URINE: ABNORMAL
BUN BLDV-MCNC: 21 MG/DL (ref 7–20)
CALCIUM SERPL-MCNC: 9.5 MG/DL (ref 8.3–10.6)
CHLORIDE BLD-SCNC: 106 MMOL/L (ref 99–110)
CLARITY: CLEAR
CO2: 21 MMOL/L (ref 21–32)
COLOR: YELLOW
CREAT SERPL-MCNC: 1 MG/DL (ref 0.6–1.1)
EOSINOPHILS ABSOLUTE: 0.1 K/UL (ref 0–0.6)
EOSINOPHILS RELATIVE PERCENT: 0.9 %
EPITHELIAL CELLS, UA: ABNORMAL /HPF (ref 0–5)
GFR AFRICAN AMERICAN: >60
GFR NON-AFRICAN AMERICAN: 57
GLUCOSE BLD-MCNC: 113 MG/DL (ref 70–99)
GLUCOSE URINE: NEGATIVE MG/DL
HCT VFR BLD CALC: 44.7 % (ref 36–48)
HEMOGLOBIN: 15.1 G/DL (ref 12–16)
KETONES, URINE: NEGATIVE MG/DL
LEUKOCYTE ESTERASE, URINE: ABNORMAL
LYMPHOCYTES ABSOLUTE: 2 K/UL (ref 1–5.1)
LYMPHOCYTES RELATIVE PERCENT: 15.7 %
MCH RBC QN AUTO: 32.8 PG (ref 26–34)
MCHC RBC AUTO-ENTMCNC: 33.9 G/DL (ref 31–36)
MCV RBC AUTO: 96.9 FL (ref 80–100)
MICROSCOPIC EXAMINATION: YES
MONOCYTES ABSOLUTE: 0.8 K/UL (ref 0–1.3)
MONOCYTES RELATIVE PERCENT: 6.5 %
MUCUS: ABNORMAL /LPF
NEUTROPHILS ABSOLUTE: 9.8 K/UL (ref 1.7–7.7)
NEUTROPHILS RELATIVE PERCENT: 76.6 %
NITRITE, URINE: POSITIVE
PDW BLD-RTO: 13.9 % (ref 12.4–15.4)
PH UA: 8 (ref 5–8)
PLATELET # BLD: 224 K/UL (ref 135–450)
PMV BLD AUTO: 8.2 FL (ref 5–10.5)
POTASSIUM REFLEX MAGNESIUM: 4.5 MMOL/L (ref 3.5–5.1)
PROTEIN UA: ABNORMAL MG/DL
RBC # BLD: 4.61 M/UL (ref 4–5.2)
RBC UA: ABNORMAL /HPF (ref 0–4)
SODIUM BLD-SCNC: 140 MMOL/L (ref 136–145)
SPECIFIC GRAVITY UA: 1.01 (ref 1–1.03)
URINE TYPE: ABNORMAL
UROBILINOGEN, URINE: 0.2 E.U./DL
WBC # BLD: 12.8 K/UL (ref 4–11)
WBC UA: ABNORMAL /HPF (ref 0–5)

## 2021-06-12 PROCEDURE — 74176 CT ABD & PELVIS W/O CONTRAST: CPT

## 2021-06-12 PROCEDURE — 6360000002 HC RX W HCPCS: Performed by: EMERGENCY MEDICINE

## 2021-06-12 PROCEDURE — 81001 URINALYSIS AUTO W/SCOPE: CPT

## 2021-06-12 PROCEDURE — 85025 COMPLETE CBC W/AUTO DIFF WBC: CPT

## 2021-06-12 PROCEDURE — 80048 BASIC METABOLIC PNL TOTAL CA: CPT

## 2021-06-12 PROCEDURE — 2580000003 HC RX 258: Performed by: STUDENT IN AN ORGANIZED HEALTH CARE EDUCATION/TRAINING PROGRAM

## 2021-06-12 PROCEDURE — 99283 EMERGENCY DEPT VISIT LOW MDM: CPT

## 2021-06-12 PROCEDURE — 96365 THER/PROPH/DIAG IV INF INIT: CPT

## 2021-06-12 PROCEDURE — 2580000003 HC RX 258: Performed by: EMERGENCY MEDICINE

## 2021-06-12 PROCEDURE — 96375 TX/PRO/DX INJ NEW DRUG ADDON: CPT

## 2021-06-12 PROCEDURE — 6360000002 HC RX W HCPCS: Performed by: STUDENT IN AN ORGANIZED HEALTH CARE EDUCATION/TRAINING PROGRAM

## 2021-06-12 PROCEDURE — 96366 THER/PROPH/DIAG IV INF ADDON: CPT

## 2021-06-12 RX ORDER — 0.9 % SODIUM CHLORIDE 0.9 %
1000 INTRAVENOUS SOLUTION INTRAVENOUS ONCE
Status: COMPLETED | OUTPATIENT
Start: 2021-06-12 | End: 2021-06-12

## 2021-06-12 RX ORDER — ONDANSETRON 2 MG/ML
4 INJECTION INTRAMUSCULAR; INTRAVENOUS ONCE
Status: COMPLETED | OUTPATIENT
Start: 2021-06-12 | End: 2021-06-12

## 2021-06-12 RX ORDER — CIPROFLOXACIN 250 MG/1
250 TABLET, FILM COATED ORAL 2 TIMES DAILY
Qty: 14 TABLET | Refills: 0 | Status: SHIPPED | OUTPATIENT
Start: 2021-06-12 | End: 2021-06-19

## 2021-06-12 RX ORDER — TAMSULOSIN HYDROCHLORIDE 0.4 MG/1
0.4 CAPSULE ORAL DAILY
Qty: 5 CAPSULE | Refills: 0 | Status: SHIPPED | OUTPATIENT
Start: 2021-06-12 | End: 2022-10-17

## 2021-06-12 RX ORDER — MORPHINE SULFATE 4 MG/ML
4 INJECTION, SOLUTION INTRAMUSCULAR; INTRAVENOUS ONCE
Status: COMPLETED | OUTPATIENT
Start: 2021-06-12 | End: 2021-06-12

## 2021-06-12 RX ORDER — KETOROLAC TROMETHAMINE 30 MG/ML
15 INJECTION, SOLUTION INTRAMUSCULAR; INTRAVENOUS ONCE
Status: COMPLETED | OUTPATIENT
Start: 2021-06-12 | End: 2021-06-12

## 2021-06-12 RX ADMIN — CEFTRIAXONE SODIUM 2000 MG: 2 INJECTION, POWDER, FOR SOLUTION INTRAMUSCULAR; INTRAVENOUS at 07:46

## 2021-06-12 RX ADMIN — KETOROLAC TROMETHAMINE 15 MG: 30 INJECTION, SOLUTION INTRAMUSCULAR at 06:28

## 2021-06-12 RX ADMIN — MORPHINE SULFATE 4 MG: 4 INJECTION INTRAVENOUS at 07:45

## 2021-06-12 RX ADMIN — SODIUM CHLORIDE 1000 ML: 9 INJECTION, SOLUTION INTRAVENOUS at 06:28

## 2021-06-12 RX ADMIN — ONDANSETRON 4 MG: 2 INJECTION INTRAMUSCULAR; INTRAVENOUS at 06:28

## 2021-06-12 RX ADMIN — HYDROMORPHONE HYDROCHLORIDE 0.5 MG: 1 INJECTION, SOLUTION INTRAMUSCULAR; INTRAVENOUS; SUBCUTANEOUS at 08:16

## 2021-06-12 ASSESSMENT — PAIN DESCRIPTION - FREQUENCY: FREQUENCY: CONTINUOUS

## 2021-06-12 ASSESSMENT — PAIN SCALES - WONG BAKER: WONGBAKER_NUMERICALRESPONSE: 4

## 2021-06-12 ASSESSMENT — ENCOUNTER SYMPTOMS
SHORTNESS OF BREATH: 0
BACK PAIN: 0

## 2021-06-12 ASSESSMENT — PAIN DESCRIPTION - PAIN TYPE
TYPE: ACUTE PAIN
TYPE: ACUTE PAIN

## 2021-06-12 ASSESSMENT — PAIN DESCRIPTION - LOCATION
LOCATION: FLANK
LOCATION: FLANK

## 2021-06-12 ASSESSMENT — PAIN DESCRIPTION - DESCRIPTORS: DESCRIPTORS: CONSTANT

## 2021-06-12 ASSESSMENT — PAIN SCALES - GENERAL
PAINLEVEL_OUTOF10: 4
PAINLEVEL_OUTOF10: 10
PAINLEVEL_OUTOF10: 6
PAINLEVEL_OUTOF10: 10

## 2021-06-12 ASSESSMENT — PAIN DESCRIPTION - ONSET: ONSET: SUDDEN

## 2021-06-12 ASSESSMENT — PAIN DESCRIPTION - ORIENTATION
ORIENTATION: RIGHT
ORIENTATION: RIGHT

## 2021-06-12 NOTE — ED PROVIDER NOTES
[address] [facility] ENCOUNTER          ATTENDING PHYSICIAN NOTE       Date of evaluation: 6/12/2021    ADDENDUM:      Care of this patient was assumed from Dr. Atiya Leija. The patient was seen for Back Pain (Mid back pain, more so flank pain, that radiates to the front)  . The patient's initial evaluation and plan have been discussed with the prior provider who initially evaluated the patient. Nursing Notes, Past Medical Hx, Past Surgical Hx, Social Hx, Allergies, and Family Hx were all reviewed. Diagnostic Results     RADIOLOGY:  CT ABDOMEN PELVIS WO CONTRAST Additional Contrast? None   Final Result      1. Obstructing 2 mm calculus distal right ureter resulting in moderate right hydronephrosis and right hydroureter. Correlate clinically. 2.  Stable left renal cyst with punctate nonobstructing stone lower pole. 3.  Hepatic steatosis with areas of fatty sparing.                 LABS:   Results for orders placed or performed during the hospital encounter of 06/12/21   CBC Auto Differential   Result Value Ref Range    WBC 12.8 (H) 4.0 - 11.0 K/uL    RBC 4.61 4.00 - 5.20 M/uL    Hemoglobin 15.1 12.0 - 16.0 g/dL    Hematocrit 44.7 36.0 - 48.0 %    MCV 96.9 80.0 - 100.0 fL    MCH 32.8 26.0 - 34.0 pg    MCHC 33.9 31.0 - 36.0 g/dL    RDW 13.9 12.4 - 15.4 %    Platelets 877 629 - 274 K/uL    MPV 8.2 5.0 - 10.5 fL    Neutrophils % 76.6 %    Lymphocytes % 15.7 %    Monocytes % 6.5 %    Eosinophils % 0.9 %    Basophils % 0.3 %    Neutrophils Absolute 9.8 (H) 1.7 - 7.7 K/uL    Lymphocytes Absolute 2.0 1.0 - 5.1 K/uL    Monocytes Absolute 0.8 0.0 - 1.3 K/uL    Eosinophils Absolute 0.1 0.0 - 0.6 K/uL    Basophils Absolute 0.0 0.0 - 0.2 K/uL   Basic Metabolic Panel w/ Reflex to MG   Result Value Ref Range    Sodium 140 136 - 145 mmol/L    Potassium reflex Magnesium 4.5 3.5 - 5.1 mmol/L    Chloride 106 99 - 110 mmol/L    CO2 21 21 - 32 mmol/L    Anion Gap 13 3 - 16    Glucose 113 (H) 70 - 99 mg/dL    BUN 21 (H) 7 - 20 mg/dL    CREATININE 1.0 0.6 - 1.1 mg/dL    GFR Non- 57 (A) >60    GFR African American >60 >60    Calcium 9.5 8.3 - 10.6 mg/dL   Urinalysis, reflex to microscopic   Result Value Ref Range    Color, UA Yellow Straw/Yellow    Clarity, UA Clear Clear    Glucose, Ur Negative Negative mg/dL    Bilirubin Urine Negative Negative    Ketones, Urine Negative Negative mg/dL    Specific Gravity, UA 1.015 1.005 - 1.030    Blood, Urine MODERATE (A) Negative    pH, UA 8.0 5.0 - 8.0    Protein, UA TRACE (A) Negative mg/dL    Urobilinogen, Urine 0.2 <2.0 E.U./dL    Nitrite, Urine POSITIVE (A) Negative    Leukocyte Esterase, Urine SMALL (A) Negative    Microscopic Examination YES     Urine Type Voided    Microscopic Urinalysis   Result Value Ref Range    Mucus, UA 1+ (A) None Seen /LPF    WBC, UA  (A) 0 - 5 /HPF    RBC, UA 5-10 (A) 0 - 4 /HPF    Epithelial Cells, UA 6-10 (A) 0 - 5 /HPF    Bacteria, UA 3+ (A) None Seen /HPF       RECENT VITALS:  BP: (!) 173/94, Temp: 97.8 °F (36.6 °C), Pulse: 81, Resp: 20, SpO2: 100 %     Procedures       ED Course     The patient was given the following medications:  Orders Placed This Encounter   Medications    0.9 % sodium chloride bolus    ondansetron (ZOFRAN) injection 4 mg    ketorolac (TORADOL) injection 15 mg    morphine injection 4 mg    cefTRIAXone (ROCEPHIN) 2000 mg IVPB in D5W 50ml minibag     Order Specific Question:   Antimicrobial Indications     Answer:   Urinary Tract Infection    HYDROmorphone (DILAUDID) injection 0.5 mg    ciprofloxacin (CIPRO) 250 MG tablet     Sig: Take 1 tablet by mouth 2 times daily for 7 days     Dispense:  14 tablet     Refill:  0       CONSULTS:  IP CONSULT TO [address] / ASSESSMENT / Jeremiah Villalbaanderson is a 54 y.o. female with a history of complicated urinary tract infection require stenting approximately 1 year ago and now presents with acute onset of right flank pain with radiation to abdomen. On arrival patient hypertensive and in obvious discomfort, initial laboratory work concerning for white blood cell count of 12.8 and urinalysis with concern for infection. Given patient continued right flank pain with radiation complicated by urinary tract infection we will plan on CT scan without contrast to assess for for stone. While awaiting imaging we will plan to provide continued pain control and IV antibiotics. Patient CT scan noted 2 mm distal stone with moderate hydronephrosis, discussed this with urology who recommended admission and antibiotics for stenting tomorrow however patient remains adamant about being discharged home, urology is okay with discharge on antibiotics and Flomax as long as patient has close follow-up and presents to the emergency department if she develops fever. Patient advised of the seriousness of her disease which she seems to understand however remains adamant about being discharged home, we will plan for discharge with antibiotics, Flomax, and close outpatient follow-up with her urologist Dr. Shirley Buckley. Patient vies return the emergency department immediately if she experiences fever, worsening pain, inability tolerate oral intake, or any other concerning changes in her symptoms. Patient agrees with plan, all questions answered. Clinical Impression     1. Urinary tract infection with hematuria, site unspecified    2.  Kidney stone        Disposition     PATIENT REFERRED TO:  Lenny Ventrua MD  [place] [id number]  [facility] [address] [place] [address]  [phone]    Schedule an appointment as soon as possible for a visit   For urinary tract infection in the setting of kidney stone      DISCHARGE MEDICATIONS:  New Prescriptions    CIPROFLOXACIN (CIPRO) 250 MG TABLET    Take 1 tablet by mouth 2 times daily for 7 days       Savanah Mathews MD  06/12/21 [id number]

## 2021-06-12 NOTE — ED NOTES
Patient self ambulating to hallway bathroom to give a urine sample.       TOMASZ Segovia  06/12/21 3133

## 2022-04-21 ENCOUNTER — HOSPITAL ENCOUNTER (EMERGENCY)
Age: 57
Discharge: HOME | End: 2022-04-21
Attending: EMERGENCY MEDICINE
Payer: COMMERCIAL

## 2022-04-21 ENCOUNTER — APPOINTMENT (OUTPATIENT)
Dept: CT IMAGING | Age: 57
End: 2022-04-21
Payer: COMMERCIAL

## 2022-04-21 VITALS
OXYGEN SATURATION: 99 % | HEIGHT: 63 IN | WEIGHT: 154 LBS | SYSTOLIC BLOOD PRESSURE: 136 MMHG | DIASTOLIC BLOOD PRESSURE: 86 MMHG | BODY MASS INDEX: 27.29 KG/M2 | RESPIRATION RATE: 15 BRPM

## 2022-04-21 DIAGNOSIS — N39.0 ACUTE UTI: Primary | ICD-10-CM

## 2022-04-21 LAB
ALBUMIN SERPL-MCNC: 4.4 G/DL (ref 3.4–5)
ALP BLD-CCNC: 90 U/L (ref 40–129)
ALT SERPL-CCNC: 35 U/L (ref 10–40)
ANION GAP SERPL CALCULATED.3IONS-SCNC: 7 MMOL/L (ref 3–16)
AST SERPL-CCNC: 59 U/L (ref 15–37)
BACTERIA: ABNORMAL /HPF
BASOPHILS ABSOLUTE: 0.1 K/UL (ref 0–0.2)
BASOPHILS RELATIVE PERCENT: 1.3 %
BILIRUB SERPL-MCNC: 0.5 MG/DL (ref 0–1)
BILIRUBIN DIRECT: <0.2 MG/DL (ref 0–0.3)
BILIRUBIN URINE: NEGATIVE
BILIRUBIN, INDIRECT: ABNORMAL MG/DL (ref 0–1)
BLOOD, URINE: ABNORMAL
BUN BLDV-MCNC: 23 MG/DL (ref 7–20)
CALCIUM SERPL-MCNC: 9 MG/DL (ref 8.3–10.6)
CHLORIDE BLD-SCNC: 103 MMOL/L (ref 99–110)
CLARITY: ABNORMAL
CO2: 23 MMOL/L (ref 21–32)
COLOR: YELLOW
CREAT SERPL-MCNC: 0.7 MG/DL (ref 0.6–1.1)
EOSINOPHILS ABSOLUTE: 0.1 K/UL (ref 0–0.6)
EOSINOPHILS RELATIVE PERCENT: 1.5 %
EPITHELIAL CELLS, UA: ABNORMAL /HPF (ref 0–5)
GFR AFRICAN AMERICAN: >60
GFR NON-AFRICAN AMERICAN: >60
GLUCOSE BLD-MCNC: 97 MG/DL (ref 70–99)
GLUCOSE URINE: NEGATIVE MG/DL
HCT VFR BLD CALC: 47 % (ref 36–48)
HEMOGLOBIN: 15.5 G/DL (ref 12–16)
KETONES, URINE: NEGATIVE MG/DL
LEUKOCYTE ESTERASE, URINE: ABNORMAL
LIPASE: 33 U/L (ref 13–60)
LYMPHOCYTES ABSOLUTE: 2 K/UL (ref 1–5.1)
LYMPHOCYTES RELATIVE PERCENT: 20.5 %
MAGNESIUM: 2.2 MG/DL (ref 1.8–2.4)
MCH RBC QN AUTO: 32.9 PG (ref 26–34)
MCHC RBC AUTO-ENTMCNC: 33 G/DL (ref 31–36)
MCV RBC AUTO: 99.5 FL (ref 80–100)
MICROSCOPIC EXAMINATION: YES
MONOCYTES ABSOLUTE: 0.6 K/UL (ref 0–1.3)
MONOCYTES RELATIVE PERCENT: 6.5 %
NEUTROPHILS ABSOLUTE: 6.8 K/UL (ref 1.7–7.7)
NEUTROPHILS RELATIVE PERCENT: 70.2 %
NITRITE, URINE: POSITIVE
PDW BLD-RTO: 13.6 % (ref 12.4–15.4)
PH UA: 6 (ref 5–8)
PHOSPHORUS: 4.8 MG/DL (ref 2.5–4.9)
PLATELET # BLD: 218 K/UL (ref 135–450)
PMV BLD AUTO: 8.1 FL (ref 5–10.5)
POTASSIUM SERPL-SCNC: 4.8 MMOL/L (ref 3.5–5.1)
POTASSIUM SERPL-SCNC: 9.3 MMOL/L (ref 3.5–5.1)
PROTEIN UA: ABNORMAL MG/DL
RBC # BLD: 4.72 M/UL (ref 4–5.2)
RBC UA: ABNORMAL /HPF (ref 0–4)
SODIUM BLD-SCNC: 133 MMOL/L (ref 136–145)
SPECIFIC GRAVITY UA: 1.02 (ref 1–1.03)
TOTAL PROTEIN: 7.6 G/DL (ref 6.4–8.2)
URINE TYPE: ABNORMAL
UROBILINOGEN, URINE: 0.2 E.U./DL
WBC # BLD: 9.8 K/UL (ref 4–11)
WBC UA: ABNORMAL /HPF (ref 0–5)

## 2022-04-21 PROCEDURE — 80076 HEPATIC FUNCTION PANEL: CPT

## 2022-04-21 PROCEDURE — 99284 EMERGENCY DEPT VISIT MOD MDM: CPT

## 2022-04-21 PROCEDURE — 96365 THER/PROPH/DIAG IV INF INIT: CPT

## 2022-04-21 PROCEDURE — 36415 COLL VENOUS BLD VENIPUNCTURE: CPT

## 2022-04-21 PROCEDURE — 6360000002 HC RX W HCPCS: Performed by: EMERGENCY MEDICINE

## 2022-04-21 PROCEDURE — 2580000003 HC RX 258: Performed by: EMERGENCY MEDICINE

## 2022-04-21 PROCEDURE — 84100 ASSAY OF PHOSPHORUS: CPT

## 2022-04-21 PROCEDURE — 83690 ASSAY OF LIPASE: CPT

## 2022-04-21 PROCEDURE — 84132 ASSAY OF SERUM POTASSIUM: CPT

## 2022-04-21 PROCEDURE — 80048 BASIC METABOLIC PNL TOTAL CA: CPT

## 2022-04-21 PROCEDURE — 81001 URINALYSIS AUTO W/SCOPE: CPT

## 2022-04-21 PROCEDURE — 85025 COMPLETE CBC W/AUTO DIFF WBC: CPT

## 2022-04-21 PROCEDURE — 74176 CT ABD & PELVIS W/O CONTRAST: CPT

## 2022-04-21 PROCEDURE — 83735 ASSAY OF MAGNESIUM: CPT

## 2022-04-21 RX ORDER — LORAZEPAM 1 MG/1
1 TABLET ORAL EVERY 6 HOURS PRN
COMMUNITY

## 2022-04-21 RX ORDER — CEFDINIR 300 MG/1
300 CAPSULE ORAL 2 TIMES DAILY
Qty: 14 CAPSULE | Refills: 0 | Status: SHIPPED | OUTPATIENT
Start: 2022-04-21 | End: 2022-04-28

## 2022-04-21 RX ORDER — IBUPROFEN 600 MG/1
600 TABLET ORAL EVERY 6 HOURS PRN
COMMUNITY
End: 2022-10-05 | Stop reason: ALTCHOICE

## 2022-04-21 RX ADMIN — CEFTRIAXONE 1000 MG: 1 INJECTION, POWDER, FOR SOLUTION INTRAMUSCULAR; INTRAVENOUS at 09:29

## 2022-04-21 ASSESSMENT — PAIN - FUNCTIONAL ASSESSMENT
PAIN_FUNCTIONAL_ASSESSMENT: NONE - DENIES PAIN
PAIN_FUNCTIONAL_ASSESSMENT: NONE - DENIES PAIN

## 2022-04-21 NOTE — ED PROVIDER NOTES
[address] [facility] 71 ENCOUNTER          ATTENDING PHYSICIAN NOTE       Date of evaluation: 2022    Chief Complaint     Flank Pain (R flank pain started this morning)      History of Present Illness     Erick Interiano is a 64 y.o. female who presents to the emergency department with a complaint of flank pain. The patient has a prior history of having a renal stent placed on the right for what sounds like either obstruction or a large stone. That has long since been removed but she presents today with a complaint of flank pain symptoms which are concerning for symptoms as she had in the past.  The patient describes primarily left-sided flank pain which is sharp in nature she does have some urinary frequency denies burning with urination or dysuria she has had some discoloration and darkening of her urine but denies any hematuria. She awoke this morning and started having right-sided flank pain and became concerned and so presents to the emergency department she reports that she took some ibuprofen and the pain has alleviated now. Review of Systems     As documented in the HPI, otherwise all other systems were reviewed and were negative. Past Medical, Surgical, Family, and Social History     She has no past medical history on file. She has a past surgical history that includes  section and CYSTOSCOPY INSERTION / REMOVAL STENT / STONE (Left, 2020). Her family history is not on file. She reports that she has been smoking. She has been smoking about 1.00 pack per day. She has never used smokeless tobacco. She reports current alcohol use. She reports that she does not use drugs.     Medications     Previous Medications    FAMOTIDINE (PEPCID) 20 MG TABLET    Take 1 tablet by mouth 2 times daily    IBUPROFEN (ADVIL;MOTRIN) 600 MG TABLET    Take 600 mg by mouth every 6 hours as needed for Pain    LORAZEPAM (ATIVAN) 1 MG TABLET    Take 1 mg by mouth every 6 hours as needed for Anxiety. NICOTINE (NICODERM CQ) 14 MG/24HR    Place 1 patch onto the skin daily    TAMSULOSIN (FLOMAX) 0.4 MG CAPSULE    Take 1 capsule by mouth daily for 5 doses       Allergies     She has No Known Allergies. Physical Exam     INITIAL VITALS: BP: 136/86,  ,  ,  , SpO2: 98 %   General: Overall well-appearing 71-year-old female who is sitting in bed in no apparent cardiorespiratory distress  HEENT:  head is atraumatic, pupils equal round and reactive to light, sclera are clear, oropharynx is nonerythematous  Neck: supple, no lymphadenopathy  Chest: nonlabored respirations, equal chest rise bilaterally, no accessory muscle use  Cardiovascular: Regular, rate, and rhythm, 2+ radial pulses bilaterally, capillary refill 2 seconds  Abdominal: Soft, nontender, nondistended, positive bowel sounds throughout, no rebound or guarding, no CVA tenderness  Skin: Warm, dry well perfused, no rashes  Musculoskeletal: no obvious deformities, no tenderness to palpation diffusely  Neurologic:  alert and oriented x4, speech is clear and intact without dysarthria, gait is intact    Diagnostic Results       RADIOLOGY:  CT ABDOMEN PELVIS WO CONTRAST Additional Contrast? None   Final Result      1. No acute abnormality in the abdomen and pelvis. No urolithiasis or hydronephrosis. 2.  Hepatic steatosis. 3.  Benign left renal cyst measuring 5.5 cm.           LABS:   Results for orders placed or performed during the hospital encounter of 04/21/22   CBC with Auto Differential   Result Value Ref Range    WBC 9.8 4.0 - 11.0 K/uL    RBC 4.72 4.00 - 5.20 M/uL    Hemoglobin 15.5 12.0 - 16.0 g/dL    Hematocrit 47.0 36.0 - 48.0 %    MCV 99.5 80.0 - 100.0 fL    MCH 32.9 26.0 - 34.0 pg    MCHC 33.0 31.0 - 36.0 g/dL    RDW 13.6 12.4 - 15.4 %    Platelets 460 716 - 119 K/uL    MPV 8.1 5.0 - 10.5 fL    Neutrophils % 70.2 %    Lymphocytes % 20.5 %    Monocytes % 6.5 %    Eosinophils % 1.5 %    Basophils % 1.3 %    Neutrophils Absolute 6.8 1.7 - 7.7 K/uL    Lymphocytes Absolute 2.0 1.0 - 5.1 K/uL    Monocytes Absolute 0.6 0.0 - 1.3 K/uL    Eosinophils Absolute 0.1 0.0 - 0.6 K/uL    Basophils Absolute 0.1 0.0 - 0.2 K/uL   Basic Metabolic Panel   Result Value Ref Range    Sodium 133 (L) 136 - 145 mmol/L    Potassium 9.3 (HH) 3.5 - 5.1 mmol/L    Chloride 103 99 - 110 mmol/L    CO2 23 21 - 32 mmol/L    Anion Gap 7 3 - 16    Glucose 97 70 - 99 mg/dL    BUN 23 (H) 7 - 20 mg/dL    CREATININE 0.7 0.6 - 1.1 mg/dL    GFR Non-African American >60 >60    GFR African American >60 >60    Calcium 9.0 8.3 - 10.6 mg/dL   Phosphorus   Result Value Ref Range    Phosphorus 4.8 2.5 - 4.9 mg/dL   Magnesium   Result Value Ref Range    Magnesium 2.20 1.80 - 2.40 mg/dL   Hepatic Function Panel   Result Value Ref Range    Total Protein 7.6 6.4 - 8.2 g/dL    Albumin 4.4 3.4 - 5.0 g/dL    Alkaline Phosphatase 90 40 - 129 U/L    ALT 35 10 - 40 U/L    AST 59 (H) 15 - 37 U/L    Total Bilirubin 0.5 0.0 - 1.0 mg/dL    Bilirubin, Direct <0.2 0.0 - 0.3 mg/dL    Bilirubin, Indirect see below 0.0 - 1.0 mg/dL   Lipase   Result Value Ref Range    Lipase 33.0 13.0 - 60.0 U/L   Urinalysis with Microscopic   Result Value Ref Range    Color, UA Yellow Straw/Yellow    Clarity, UA SL CLOUDY (A) Clear    Glucose, Ur Negative Negative mg/dL    Bilirubin Urine Negative Negative    Ketones, Urine Negative Negative mg/dL    Specific Gravity, UA 1.025 1.005 - 1.030    Blood, Urine LARGE (A) Negative    pH, UA 6.0 5.0 - 8.0    Protein, UA TRACE (A) Negative mg/dL    Urobilinogen, Urine 0.2 <2.0 E.U./dL    Nitrite, Urine POSITIVE (A) Negative    Leukocyte Esterase, Urine SMALL (A) Negative    Microscopic Examination YES     Urine Type NotGiven     WBC, UA 10-20 (A) 0 - 5 /HPF    RBC, UA 3-4 0 - 4 /HPF    Epithelial Cells, UA 2-5 0 - 5 /HPF    Bacteria, UA 4+ (A) None Seen /HPF   Potassium   Result Value Ref Range    Potassium 4.8 3.5 - 5.1 mmol/L         RECENT VITALS:  BP: 136/86,  ,  ,  , SpO2: 98 % ED Course     Nursing Notes, Past Medical Hx, Past Surgical Hx, Social Hx, Allergies, and Family Hx were reviewed. The patient was given the following medications:  Orders Placed This Encounter   Medications    cefTRIAXone (ROCEPHIN) 1000 mg IVPB in 50 mL D5W minibag     Order Specific Question:   Antimicrobial Indications     Answer:   Urinary Tract Infection    cefdinir (OMNICEF) 300 MG capsule     Sig: Take 1 capsule by mouth 2 times daily for 7 days     Dispense:  14 capsule     Refill:  0       CONSULTS:  None    MEDICAL Richard Dangelo / EBONY / [place] Sam is a 64 y.o. female who presents to the emergency department with a complaint of bilateral flank pain initially left worse than right. On physical examination did have any reproducible pain or tenderness. She had laboratory investigations which showed a urinalysis which was concerning for urinary tract infection with small leukocyte esterase 10-20 white blood cells nitrite positivity and 4+ bacteria. CBC was within normal limits. Patient metabolic profile was significantly hemolyzed and a BUN of 23 and a creatinine of 0.7 LFTs lipase were without significant abnormalities a CT scan of the abdomen pelvis showed no evidence of any nephrolithiasis or ureterolithiasis. Overall patient does appear to have a urinary tract infection and given the presence of the flank pain concern for developing pyelonephritis. She was given ceftriaxone here in the emergency department urine culture was sent and she will be discharged home with a prescription for ALMITA MAYER. The patient's initial potassium was hemolyzed at 9 point 3 repeat 1 was 4.8. Ultimately felt patient was felt safe for discharge home. Clinical Impression     1.  Acute UTI        Disposition     PATIENT REFERRED TO:  Ameena Pitts., MD  [address] [place] [id number]  [phone]            DISCHARGE MEDICATIONS:  New Prescriptions    CEFDINIR (OMNICEF) 300 MG

## 2022-09-12 ENCOUNTER — HOSPITAL ENCOUNTER (EMERGENCY)
Age: 57
Discharge: HOME | End: 2022-09-12
Attending: EMERGENCY MEDICINE
Payer: COMMERCIAL

## 2022-09-12 ENCOUNTER — APPOINTMENT (OUTPATIENT)
Dept: CT IMAGING | Age: 57
End: 2022-09-12
Payer: COMMERCIAL

## 2022-09-12 VITALS
RESPIRATION RATE: 20 BRPM | SYSTOLIC BLOOD PRESSURE: 148 MMHG | DIASTOLIC BLOOD PRESSURE: 72 MMHG | OXYGEN SATURATION: 100 % | TEMPERATURE: 98.3 F | HEART RATE: 72 BPM

## 2022-09-12 DIAGNOSIS — N20.0 KIDNEY STONE: Primary | ICD-10-CM

## 2022-09-12 LAB
ANION GAP SERPL CALCULATED.3IONS-SCNC: 11 MMOL/L (ref 3–16)
BACTERIA: ABNORMAL /HPF
BASOPHILS ABSOLUTE: 0.1 K/UL (ref 0–0.2)
BASOPHILS RELATIVE PERCENT: 0.5 %
BILIRUBIN URINE: NEGATIVE
BLOOD, URINE: ABNORMAL
BUN BLDV-MCNC: 23 MG/DL (ref 7–20)
CALCIUM SERPL-MCNC: 8.8 MG/DL (ref 8.3–10.6)
CHLORIDE BLD-SCNC: 110 MMOL/L (ref 99–110)
CLARITY: CLEAR
CO2: 22 MMOL/L (ref 21–32)
COLOR: YELLOW
CREAT SERPL-MCNC: 0.7 MG/DL (ref 0.6–1.1)
EOSINOPHILS ABSOLUTE: 0.2 K/UL (ref 0–0.6)
EOSINOPHILS RELATIVE PERCENT: 2.3 %
EPITHELIAL CELLS, UA: ABNORMAL /HPF (ref 0–5)
GFR AFRICAN AMERICAN: >60
GFR NON-AFRICAN AMERICAN: >60
GLUCOSE BLD-MCNC: 114 MG/DL (ref 70–99)
GLUCOSE URINE: NEGATIVE MG/DL
HCT VFR BLD CALC: 45.2 % (ref 36–48)
HEMOGLOBIN: 14.4 G/DL (ref 12–16)
KETONES, URINE: NEGATIVE MG/DL
LEUKOCYTE ESTERASE, URINE: ABNORMAL
LYMPHOCYTES ABSOLUTE: 2.7 K/UL (ref 1–5.1)
LYMPHOCYTES RELATIVE PERCENT: 25.1 %
MCH RBC QN AUTO: 32.1 PG (ref 26–34)
MCHC RBC AUTO-ENTMCNC: 31.8 G/DL (ref 31–36)
MCV RBC AUTO: 101.1 FL (ref 80–100)
MICROSCOPIC EXAMINATION: YES
MONOCYTES ABSOLUTE: 0.7 K/UL (ref 0–1.3)
MONOCYTES RELATIVE PERCENT: 7 %
NEUTROPHILS ABSOLUTE: 7 K/UL (ref 1.7–7.7)
NEUTROPHILS RELATIVE PERCENT: 65.1 %
NITRITE, URINE: NEGATIVE
PDW BLD-RTO: 14.2 % (ref 12.4–15.4)
PH UA: 5.5 (ref 5–8)
PLATELET # BLD: 240 K/UL (ref 135–450)
PMV BLD AUTO: 8.7 FL (ref 5–10.5)
POTASSIUM REFLEX MAGNESIUM: 5.2 MMOL/L (ref 3.5–5.1)
PROTEIN UA: NEGATIVE MG/DL
RBC # BLD: 4.47 M/UL (ref 4–5.2)
RBC UA: ABNORMAL /HPF (ref 0–4)
SODIUM BLD-SCNC: 143 MMOL/L (ref 136–145)
SPECIFIC GRAVITY UA: >=1.03 (ref 1–1.03)
URINE TYPE: ABNORMAL
UROBILINOGEN, URINE: 0.2 E.U./DL
WBC # BLD: 10.7 K/UL (ref 4–11)
WBC UA: ABNORMAL /HPF (ref 0–5)

## 2022-09-12 PROCEDURE — 74176 CT ABD & PELVIS W/O CONTRAST: CPT

## 2022-09-12 PROCEDURE — 80048 BASIC METABOLIC PNL TOTAL CA: CPT

## 2022-09-12 PROCEDURE — 81001 URINALYSIS AUTO W/SCOPE: CPT

## 2022-09-12 PROCEDURE — 85025 COMPLETE CBC W/AUTO DIFF WBC: CPT

## 2022-09-12 PROCEDURE — 96374 THER/PROPH/DIAG INJ IV PUSH: CPT

## 2022-09-12 PROCEDURE — 96375 TX/PRO/DX INJ NEW DRUG ADDON: CPT

## 2022-09-12 PROCEDURE — 99284 EMERGENCY DEPT VISIT MOD MDM: CPT

## 2022-09-12 PROCEDURE — 6360000002 HC RX W HCPCS: Performed by: EMERGENCY MEDICINE

## 2022-09-12 RX ORDER — KETOROLAC TROMETHAMINE 30 MG/ML
15 INJECTION, SOLUTION INTRAMUSCULAR; INTRAVENOUS ONCE
Status: COMPLETED | OUTPATIENT
Start: 2022-09-12 | End: 2022-09-12

## 2022-09-12 RX ORDER — MORPHINE SULFATE 4 MG/ML
4 INJECTION, SOLUTION INTRAMUSCULAR; INTRAVENOUS ONCE
Status: COMPLETED | OUTPATIENT
Start: 2022-09-12 | End: 2022-09-12

## 2022-09-12 RX ADMIN — KETOROLAC TROMETHAMINE 15 MG: 30 INJECTION, SOLUTION INTRAMUSCULAR at 07:28

## 2022-09-12 RX ADMIN — MORPHINE SULFATE 4 MG: 4 INJECTION, SOLUTION INTRAMUSCULAR; INTRAVENOUS at 08:17

## 2022-09-12 ASSESSMENT — ENCOUNTER SYMPTOMS
VOMITING: 0
ABDOMINAL PAIN: 1
SHORTNESS OF BREATH: 0
DIARRHEA: 0

## 2022-09-12 ASSESSMENT — PAIN DESCRIPTION - LOCATION
LOCATION: FLANK
LOCATION: FLANK

## 2022-09-12 ASSESSMENT — PAIN DESCRIPTION - ORIENTATION
ORIENTATION: LEFT
ORIENTATION: LEFT

## 2022-09-12 ASSESSMENT — PAIN SCALES - GENERAL
PAINLEVEL_OUTOF10: 10
PAINLEVEL_OUTOF10: 0

## 2022-09-12 ASSESSMENT — PAIN - FUNCTIONAL ASSESSMENT: PAIN_FUNCTIONAL_ASSESSMENT: NONE - DENIES PAIN

## 2022-09-12 NOTE — ED PROVIDER NOTES
[address] [facility] ENCOUNTER          ATTENDING PHYSICIAN NOTE       Date of evaluation: 2022    Chief Complaint     Flank Pain      History of Present Illness     Zaida Interiano is a 62 y.o. female who presents with sudden onset 8 out of 10 right flank pain radiating to the right groin that began approximately 2 hours. The patient has not experienced pain like this before. She noticed dark urine yesterday and has urge to urinate and thinks there may be blood in her urine but denies dysuria. She denies fever. She cannot tell me whether she is nauseous. She is writhing in the bed in pain. Review of Systems     Review of Systems   Constitutional:  Negative for fever. Respiratory:  Negative for shortness of breath. Cardiovascular:  Negative for chest pain. Gastrointestinal:  Positive for abdominal pain. Negative for diarrhea and vomiting. Genitourinary:  Positive for flank pain, hematuria and urgency. Negative for difficulty urinating and dysuria. All other systems reviewed and are negative. Past Medical, Surgical, Family, and Social History     She has no past medical history on file. She has a past surgical history that includes  section and CYSTOSCOPY INSERTION / REMOVAL STENT / STONE (Left, 2020). Her family history is not on file. She reports that she has been smoking. She has been smoking an average of 1 pack per day. She has never used smokeless tobacco. She reports current alcohol use. She reports that she does not use drugs. Medications     Current Discharge Medication List        CONTINUE these medications which have NOT CHANGED    Details   ibuprofen (ADVIL;MOTRIN) 600 MG tablet Take 600 mg by mouth every 6 hours as needed for Pain      LORazepam (ATIVAN) 1 MG tablet Take 1 mg by mouth every 6 hours as needed for Anxiety.       tamsulosin (FLOMAX) 0.4 MG capsule Take 1 capsule by mouth daily for 5 doses  Qty: 5 capsule, Refills: 0 nicotine (NICODERM CQ) 14 MG/24HR Place 1 patch onto the skin daily  Qty: 30 patch, Refills: 3      famotidine (PEPCID) 20 MG tablet Take 1 tablet by mouth 2 times daily  Qty: 60 tablet, Refills: 3             Allergies     She has No Known Allergies. Physical Exam     INITIAL VITALS: BP: (!) 170/106, Temp: 98.3 °F (36.8 °C), Heart Rate: 72, Resp: 20, SpO2: 100 %   Physical Exam  Vitals and nursing note reviewed. Constitutional:       General: She is in acute distress. Appearance: She is well-developed. She is not diaphoretic. Cardiovascular:      Rate and Rhythm: Normal rate and regular rhythm. Pulmonary:      Effort: Pulmonary effort is normal.      Breath sounds: Normal breath sounds. Abdominal:      General: Bowel sounds are normal. There is no distension. Palpations: Abdomen is soft. Tenderness: There is no abdominal tenderness. There is no right CVA tenderness. Skin:     General: Skin is warm and dry. Neurological:      Mental Status: She is alert and oriented to person, place, and time. Psychiatric:         Behavior: Behavior normal.       Diagnostic Results     RADIOLOGY:  CT ABDOMEN PELVIS WO CONTRAST Additional Contrast? None   Final Result   1. No acute abnormality of the abdomen or pelvis. 2. Hepatic steatosis. 3. Normal appendix.    4. Punctate calculus of the urinary bladder, without significant change in the interval.             LABS:   Results for orders placed or performed during the hospital encounter of 09/12/22   Urinalysis with Microscopic   Result Value Ref Range    Color, UA Yellow Straw/Yellow    Clarity, UA Clear Clear    Glucose, Ur Negative Negative mg/dL    Bilirubin Urine Negative Negative    Ketones, Urine Negative Negative mg/dL    Specific Gravity, UA >=1.030 1.005 - 1.030    Blood, Urine LARGE (A) Negative    pH, UA 5.5 5.0 - 8.0    Protein, UA Negative Negative mg/dL    Urobilinogen, Urine 0.2 <2.0 E.U./dL    Nitrite, Urine Negative Negative Leukocyte Esterase, Urine TRACE (A) Negative    Microscopic Examination YES     Urine Type NotGiven     WBC, UA None seen 0 - 5 /HPF    RBC, UA 11-20 (A) 0 - 4 /HPF    Epithelial Cells, UA 2-5 0 - 5 /HPF    Bacteria, UA Rare (A) None Seen /HPF   CBC with Auto Differential   Result Value Ref Range    WBC 10.7 4.0 - 11.0 K/uL    RBC 4.47 4.00 - 5.20 M/uL    Hemoglobin 14.4 12.0 - 16.0 g/dL    Hematocrit 45.2 36.0 - 48.0 %    .1 (H) 80.0 - 100.0 fL    MCH 32.1 26.0 - 34.0 pg    MCHC 31.8 31.0 - 36.0 g/dL    RDW 14.2 12.4 - 15.4 %    Platelets 446 335 - 696 K/uL    MPV 8.7 5.0 - 10.5 fL    Neutrophils % 65.1 %    Lymphocytes % 25.1 %    Monocytes % 7.0 %    Eosinophils % 2.3 %    Basophils % 0.5 %    Neutrophils Absolute 7.0 1.7 - 7.7 K/uL    Lymphocytes Absolute 2.7 1.0 - 5.1 K/uL    Monocytes Absolute 0.7 0.0 - 1.3 K/uL    Eosinophils Absolute 0.2 0.0 - 0.6 K/uL    Basophils Absolute 0.1 0.0 - 0.2 K/uL   BMP w/ Reflex to MG   Result Value Ref Range    Sodium 143 136 - 145 mmol/L    Potassium reflex Magnesium 5.2 (H) 3.5 - 5.1 mmol/L    Chloride 110 99 - 110 mmol/L    CO2 22 21 - 32 mmol/L    Anion Gap 11 3 - 16    Glucose 114 (H) 70 - 99 mg/dL    BUN 23 (H) 7 - 20 mg/dL    Creatinine 0.7 0.6 - 1.1 mg/dL    GFR Non-African American >60 >60    GFR African American >60 >60    Calcium 8.8 8.3 - 10.6 mg/dL       ED BEDSIDE ULTRASOUND:  No results found. RECENT VITALS:  BP: (!) 148/72,Temp: 98.3 °F (36.8 °C), Heart Rate: 72, Resp: 20, SpO2: 100 %     ED Course     Nursing Notes, Past Medical Hx, Past Surgical Hx, Social Hx,Allergies, and Family Hx were reviewed.          patient was given the following medications:  Orders Placed This Encounter   Medications    ketorolac (TORADOL) injection 15 mg    morphine injection 4 mg       CONSULTS:  None    MEDICAL DECISIONMAKING / ASSESSMENT / Ashlyn Thomson is a 62 y.o. female presenting with right lower quadrant pain radiating to the right flank that came on suddenly 2 hours prior to arrival.  The patient was initially writhing in pain but did get relief from medications administered. Urinalysis showed large amount of blood and for this reason CT of the abdomen pelvis with no contrast was performed. She was found to have a stone in the dependent portion of the bladder. Although this was commented on in a prior CT scan, I suspect that this likely represents a recently passed stone. History is suspicious for ureteral colic and given the blood in the urine and this finding, it is likely that this represents a passed stone and she is now no longer in pain and is not requiring any pain medication. The patient therefore was felt to be stable for discharge home. She does have a urologist with whom she can follow-up if need be but otherwise no follow-up is necessary at this time as the remainder of her scan does not demonstrate any other intra-abdominal pathology. Clinical Impression     1.  Kidney stone        Disposition     PATIENT REFERRED TO:  MD [address] [id number]  [phone]      As needed    DISCHARGE MEDICATIONS:  Current Discharge Medication List          DISPOSITION Decision To Discharge 09/12/2022 09:56:58 AM         Brielle Ross MD  09/12/22 1045

## 2022-10-03 ENCOUNTER — APPOINTMENT (OUTPATIENT)
Dept: CT IMAGING | Age: 57
End: 2022-10-03
Payer: COMMERCIAL

## 2022-10-03 ENCOUNTER — HOSPITAL ENCOUNTER (EMERGENCY)
Age: 57
Discharge: HOME | End: 2022-10-03
Attending: EMERGENCY MEDICINE
Payer: COMMERCIAL

## 2022-10-03 VITALS
DIASTOLIC BLOOD PRESSURE: 72 MMHG | BODY MASS INDEX: 27.14 KG/M2 | TEMPERATURE: 97.8 F | RESPIRATION RATE: 17 BRPM | OXYGEN SATURATION: 96 % | WEIGHT: 158.95 LBS | HEART RATE: 84 BPM | SYSTOLIC BLOOD PRESSURE: 130 MMHG | HEIGHT: 64 IN

## 2022-10-03 DIAGNOSIS — K38.8 MUCOCELE OF APPENDIX: Primary | ICD-10-CM

## 2022-10-03 LAB
A/G RATIO: 1.8 (ref 1.1–2.2)
ALBUMIN SERPL-MCNC: 4.5 G/DL (ref 3.4–5)
ALP BLD-CCNC: 119 U/L (ref 40–129)
ALT SERPL-CCNC: 49 U/L (ref 10–40)
ANION GAP SERPL CALCULATED.3IONS-SCNC: 13 MMOL/L (ref 3–16)
AST SERPL-CCNC: 33 U/L (ref 15–37)
BACTERIA: ABNORMAL /HPF
BASOPHILS ABSOLUTE: 0 K/UL (ref 0–0.2)
BASOPHILS RELATIVE PERCENT: 0.6 %
BILIRUB SERPL-MCNC: 0.7 MG/DL (ref 0–1)
BILIRUBIN URINE: NEGATIVE
BLOOD, URINE: ABNORMAL
BUN BLDV-MCNC: 19 MG/DL (ref 7–20)
CALCIUM SERPL-MCNC: 9.3 MG/DL (ref 8.3–10.6)
CHLORIDE BLD-SCNC: 107 MMOL/L (ref 99–110)
CLARITY: CLEAR
CO2: 24 MMOL/L (ref 21–32)
COLOR: YELLOW
CREAT SERPL-MCNC: 0.8 MG/DL (ref 0.6–1.1)
EOSINOPHILS ABSOLUTE: 0.1 K/UL (ref 0–0.6)
EOSINOPHILS RELATIVE PERCENT: 1.7 %
EPITHELIAL CELLS, UA: ABNORMAL /HPF (ref 0–5)
GFR AFRICAN AMERICAN: >60
GFR NON-AFRICAN AMERICAN: >60
GLUCOSE BLD-MCNC: 108 MG/DL (ref 70–99)
GLUCOSE URINE: NEGATIVE MG/DL
HCT VFR BLD CALC: 45.4 % (ref 36–48)
HEMOGLOBIN: 15.1 G/DL (ref 12–16)
KETONES, URINE: NEGATIVE MG/DL
LEUKOCYTE ESTERASE, URINE: ABNORMAL
LIPASE: 61 U/L (ref 13–60)
LYMPHOCYTES ABSOLUTE: 2 K/UL (ref 1–5.1)
LYMPHOCYTES RELATIVE PERCENT: 23.9 %
MAGNESIUM: 2 MG/DL (ref 1.8–2.4)
MCH RBC QN AUTO: 33 PG (ref 26–34)
MCHC RBC AUTO-ENTMCNC: 33.3 G/DL (ref 31–36)
MCV RBC AUTO: 98.9 FL (ref 80–100)
MICROSCOPIC EXAMINATION: YES
MONOCYTES ABSOLUTE: 0.6 K/UL (ref 0–1.3)
MONOCYTES RELATIVE PERCENT: 7.7 %
MUCUS: ABNORMAL /LPF
NEUTROPHILS ABSOLUTE: 5.5 K/UL (ref 1.7–7.7)
NEUTROPHILS RELATIVE PERCENT: 66.1 %
NITRITE, URINE: NEGATIVE
PDW BLD-RTO: 14 % (ref 12.4–15.4)
PH UA: 6 (ref 5–8)
PLATELET # BLD: 189 K/UL (ref 135–450)
PMV BLD AUTO: 8.2 FL (ref 5–10.5)
POTASSIUM REFLEX MAGNESIUM: 3.2 MMOL/L (ref 3.5–5.1)
PROTEIN UA: NEGATIVE MG/DL
RBC # BLD: 4.6 M/UL (ref 4–5.2)
RBC UA: ABNORMAL /HPF (ref 0–4)
SODIUM BLD-SCNC: 144 MMOL/L (ref 136–145)
SPECIFIC GRAVITY UA: 1.02 (ref 1–1.03)
TOTAL PROTEIN: 7 G/DL (ref 6.4–8.2)
URINE TYPE: ABNORMAL
UROBILINOGEN, URINE: 0.2 E.U./DL
WBC # BLD: 8.3 K/UL (ref 4–11)
WBC UA: ABNORMAL /HPF (ref 0–5)

## 2022-10-03 PROCEDURE — 74177 CT ABD & PELVIS W/CONTRAST: CPT

## 2022-10-03 PROCEDURE — 80053 COMPREHEN METABOLIC PANEL: CPT

## 2022-10-03 PROCEDURE — 85025 COMPLETE CBC W/AUTO DIFF WBC: CPT

## 2022-10-03 PROCEDURE — 81001 URINALYSIS AUTO W/SCOPE: CPT

## 2022-10-03 PROCEDURE — 99285 EMERGENCY DEPT VISIT HI MDM: CPT

## 2022-10-03 PROCEDURE — 83735 ASSAY OF MAGNESIUM: CPT

## 2022-10-03 PROCEDURE — 83690 ASSAY OF LIPASE: CPT

## 2022-10-03 PROCEDURE — 6360000004 HC RX CONTRAST MEDICATION: Performed by: PHYSICIAN ASSISTANT

## 2022-10-03 RX ADMIN — IOPAMIDOL 75 ML: 755 INJECTION, SOLUTION INTRAVENOUS at 15:11

## 2022-10-03 ASSESSMENT — ENCOUNTER SYMPTOMS
ABDOMINAL DISTENTION: 1
RESPIRATORY NEGATIVE: 1
COUGH: 0
NAUSEA: 1
VOMITING: 0
ABDOMINAL PAIN: 1
DIARRHEA: 0
CONSTIPATION: 0

## 2022-10-03 ASSESSMENT — PAIN DESCRIPTION - FREQUENCY: FREQUENCY: CONTINUOUS

## 2022-10-03 ASSESSMENT — PAIN DESCRIPTION - ONSET: ONSET: GRADUAL

## 2022-10-03 ASSESSMENT — PAIN DESCRIPTION - DESCRIPTORS: DESCRIPTORS: DISCOMFORT

## 2022-10-03 ASSESSMENT — PAIN DESCRIPTION - ORIENTATION: ORIENTATION: MID;UPPER

## 2022-10-03 ASSESSMENT — PAIN DESCRIPTION - PAIN TYPE: TYPE: ACUTE PAIN

## 2022-10-03 ASSESSMENT — PAIN DESCRIPTION - LOCATION: LOCATION: ABDOMEN

## 2022-10-03 ASSESSMENT — PAIN - FUNCTIONAL ASSESSMENT: PAIN_FUNCTIONAL_ASSESSMENT: 0-10

## 2022-10-03 ASSESSMENT — PAIN SCALES - GENERAL: PAINLEVEL_OUTOF10: 3

## 2022-10-03 NOTE — DISCHARGE INSTRUCTIONS
Your CT scan showed a mucocele of the appendix. This needs to be removed to determine if it is benign or potentially cancerous. Call Dr. Silvestre Moreno to schedule an appointment and surgery. Return to the ER for any worsening pain or any emergent concerns.
ABW used for calculations as pt between % of IBW.   ABW 63.5kg, IBW 72kg, 108% IBW, ht 69", BMI 20.7  Nutrient needs based on [facility] standards of care for maintenance in adults, adjusted for age and suspected malnutrition

## 2022-10-03 NOTE — ED PROVIDER NOTES
ED Attending Attestation Note     Date of evaluation: 10/3/2022    This patient was seen by the advance practice provider. I have seen and examined the patient, agree with the workup, evaluation, management and diagnosis. The care plan has been discussed. My assessment reveals a 29-year-old female who presents with a chief complaint of abdominal pain. Patient with abdominal discomfort since yesterday, states her pain has improved since yesterday, but she still wanted to come get checked out. Benign abdomen. Pablo Goldberg MD  10/03/22 [id number]

## 2022-10-03 NOTE — ED PROVIDER NOTES
[address] [facility] [address] ENCOUNTER          PHYSICIAN ASSISTANT NOTE       Date of evaluation: 10/3/2022    Chief Complaint     Abdominal Pain (Started last night denies n/v/d states no appetite)      History of Present Illness     Nidhi Interiano is a 62 y.o. female who presents for abdominal pain. Patient reports yesterday morning she ate breakfast even though she did not have much of an appetite. Patient reports she went to [employer] and felt awful. Patient reports she has generalized abdominal discomfort and bloating since yesterday. Patient feels unwell but has not had any vomiting. Patient reports normal bowel movement today and a normal bowel movement yesterday. No history of abdominal surgeries. Patient reports she has had an EGD and colonoscopy in the past which she reports were normal.  No history of similar symptoms. Patient did not take any medication for symptoms. Review of Systems     Review of Systems   Constitutional: Negative. Negative for fever. Respiratory: Negative. Negative for cough. Cardiovascular: Negative. Negative for chest pain. Gastrointestinal:  Positive for abdominal distention, abdominal pain and nausea. Negative for constipation, diarrhea and vomiting. Genitourinary: Negative. Musculoskeletal: Negative. Skin: Negative. Neurological: Negative. Psychiatric/Behavioral: Negative. All other systems reviewed and are negative. Past Medical, Surgical, Family, and Social History     She has no past medical history on file. She has a past surgical history that includes  section and CYSTOSCOPY INSERTION / REMOVAL STENT / STONE (Left, 2020). Her family history is not on file. She reports that she has been smoking. She has been smoking an average of 1 pack per day. She has never used smokeless tobacco. She reports current alcohol use. She reports that she does not use drugs.     Medications     Previous Medications FAMOTIDINE (PEPCID) 20 MG TABLET    Take 1 tablet by mouth 2 times daily    IBUPROFEN (ADVIL;MOTRIN) 600 MG TABLET    Take 600 mg by mouth every 6 hours as needed for Pain    LORAZEPAM (ATIVAN) 1 MG TABLET    Take 1 mg by mouth every 6 hours as needed for Anxiety. NICOTINE (NICODERM CQ) 14 MG/24HR    Place 1 patch onto the skin daily    TAMSULOSIN (FLOMAX) 0.4 MG CAPSULE    Take 1 capsule by mouth daily for 5 doses       Allergies     She has No Known Allergies. Physical Exam     INITIAL VITALS: BP: 126/86, Temp: 97.8 °F (36.6 °C), Heart Rate: 84, Resp: 17, SpO2: 95 %  Physical Exam  Vitals and nursing note reviewed. Constitutional:       General: She is not in acute distress. Appearance: She is not ill-appearing. HENT:      Head: Normocephalic and atraumatic. Cardiovascular:      Rate and Rhythm: Normal rate and regular rhythm. Pulmonary:      Effort: Pulmonary effort is normal.      Breath sounds: Normal breath sounds. Abdominal:      General: Abdomen is protuberant. Palpations: Abdomen is soft. Tenderness: generalized abdominal tenderness There is no guarding or rebound. Skin:     General: Skin is warm and dry. Neurological:      General: No focal deficit present. Mental Status: She is alert and oriented to person, place, and time. Psychiatric:         Mood and Affect: Mood normal.         Behavior: Behavior normal.       Diagnostic Results       RADIOLOGY:  CT ABDOMEN PELVIS W IV CONTRAST Additional Contrast? None   Final Result      1. No acute findings. 2.  1.7 cm fluid density cystic lesion inseparable from the tail of the appendix suspicious for mucocele. Surgical consultation recommended.              LABS:   Results for orders placed or performed during the hospital encounter of 10/03/22   CBC with Auto Differential   Result Value Ref Range    WBC 8.3 4.0 - 11.0 K/uL    RBC 4.60 4.00 - 5.20 M/uL    Hemoglobin 15.1 12.0 - 16.0 g/dL    Hematocrit 45.4 36.0 - 48.0 % MCV 98.9 80.0 - 100.0 fL    MCH 33.0 26.0 - 34.0 pg    MCHC 33.3 31.0 - 36.0 g/dL    RDW 14.0 12.4 - 15.4 %    Platelets 593 660 - 045 K/uL    MPV 8.2 5.0 - 10.5 fL    Neutrophils % 66.1 %    Lymphocytes % 23.9 %    Monocytes % 7.7 %    Eosinophils % 1.7 %    Basophils % 0.6 %    Neutrophils Absolute 5.5 1.7 - 7.7 K/uL    Lymphocytes Absolute 2.0 1.0 - 5.1 K/uL    Monocytes Absolute 0.6 0.0 - 1.3 K/uL    Eosinophils Absolute 0.1 0.0 - 0.6 K/uL    Basophils Absolute 0.0 0.0 - 0.2 K/uL   Comprehensive Metabolic Panel w/ Reflex to MG   Result Value Ref Range    Sodium 144 136 - 145 mmol/L    Potassium reflex Magnesium 3.2 (L) 3.5 - 5.1 mmol/L    Chloride 107 99 - 110 mmol/L    CO2 24 21 - 32 mmol/L    Anion Gap 13 3 - 16    Glucose 108 (H) 70 - 99 mg/dL    BUN 19 7 - 20 mg/dL    Creatinine 0.8 0.6 - 1.1 mg/dL    GFR Non-African American >60 >60    GFR African American >60 >60    Calcium 9.3 8.3 - 10.6 mg/dL    Total Protein 7.0 6.4 - 8.2 g/dL    Albumin 4.5 3.4 - 5.0 g/dL    Albumin/Globulin Ratio 1.8 1.1 - 2.2    Total Bilirubin 0.7 0.0 - 1.0 mg/dL    Alkaline Phosphatase 119 40 - 129 U/L    ALT 49 (H) 10 - 40 U/L    AST 33 15 - 37 U/L   Lipase   Result Value Ref Range    Lipase 61.0 (H) 13.0 - 60.0 U/L   Urinalysis with Microscopic   Result Value Ref Range    Color, UA Yellow Straw/Yellow    Clarity, UA Clear Clear    Glucose, Ur Negative Negative mg/dL    Bilirubin Urine Negative Negative    Ketones, Urine Negative Negative mg/dL    Specific Gravity, UA 1.025 1.005 - 1.030    Blood, Urine LARGE (A) Negative    pH, UA 6.0 5.0 - 8.0    Protein, UA Negative Negative mg/dL    Urobilinogen, Urine 0.2 <2.0 E.U./dL    Nitrite, Urine Negative Negative    Leukocyte Esterase, Urine TRACE (A) Negative    Microscopic Examination YES     Urine Type NotGiven     Mucus, UA 1+ (A) None Seen /LPF    WBC, UA 3-5 0 - 5 /HPF    RBC, UA 0-2 0 - 4 /HPF    Epithelial Cells, UA 2-5 0 - 5 /HPF    Bacteria, UA Rare (A) None Seen /HPF Magnesium   Result Value Ref Range    Magnesium 2.00 1.80 - 2.40 mg/dL       ED BEDSIDE ULTRASOUND:  No results found. RECENT VITALS:  BP: 130/72, Temp: 97.8 °F (36.6 °C), Heart Rate: 84, Resp: 17, SpO2: 96 %     Procedures         ED Course     Nursing Notes, Past Medical Hx,Past Surgical Hx, Social Hx, Allergies, and Family Hx were reviewed. The patient was given the following medications:  Orders Placed This Encounter   Medications    iopamidol (ISOVUE-370) 76 % injection 75 mL       CONSULTS:  Wang Farley / EBONY / Yaya Maribell is a 62 y.o. female with abdominal pain. Patient appears to feel unwell but is nontoxic and in no acute distress. Vitals are normal.  Patient has generalized abdominal tenderness on exam without peritoneal signs. Patient declines anything for pain as she would like to drive home ultimately. Patient has stable blood counts and renal function. Patient is mildly hypokalemic. Patient with a mildly elevated lipase. CT of the abdomen shows a known left renal cyst and a new appendiceal mucocele. Surgery was consulted who reports patient will need an appendectomy but it can be done outpatient which the patient prefers so she has time to arrange care for her daughter. Patient to call Dr. Kyle Solano office to schedule surgery. Return precautions discussed. This patient was also evaluated by the attending physician. All care plans were discussed and agreed upon. Clinical Impression     1.  Mucocele of appendix        Disposition     PATIENT REFERRED TO:  Moni Carreon DO  [address]  [place] [address]  [phone]    Schedule an appointment as soon as possible for a visit       [facility]  [address] [place] [address] [address]  [phone]    As needed, If symptoms worsen    DISCHARGE MEDICATIONS:  New Prescriptions    No medications on file DISPOSITION Decision To Discharge 10/03/2022 04:49:36 [address] Kateryna Armas PA-C  10/03/22 500 [facility] Kateryna Armas PA-C  10/03/22 7105

## 2022-10-03 NOTE — CONSULTS
General Surgery   Resident Consult Note    Reason for Consult: appendix mucocele    History of Present Illness:   Vijay Cabrera is a 62 y.o. female with Hx of previous  and nephrolithiasis presents with epigastric abdominal discomfort that is now resolving. She denies nausea, vomiting or fever. Denies changes in bowel movement. Patient stated she never had EGD before, however had previous colonoscopy years ago that was normal.  Patient stated she occasionally take ibuprofen for her headache, however unable to quantify how much she is taking. Denies previous history of GERD or gastric ulcer. Denies previous symptoms. Patient stated her pain is different than previous nephrolithiasis that he had before. Past Medical History:    History reviewed. No pertinent past medical history. Past Surgical History:           Procedure Laterality Date     SECTION      CYSTOSCOPY INSERTION / REMOVAL STENT / STONE Left 2020    CYSTOSCOPY, BILATERAL RETROGRADE PYLOGRAM; ATTEMPTED LEFT URETEROSCOPY; EVACUATION OF CLOT FROM BLADDER; LEFT STENT PLACEMENT performed by Della Brenner MD at [address]:  Patient has no known allergies. Medications:   Home Meds  No current facility-administered medications on file prior to encounter. Current Outpatient Medications on File Prior to Encounter   Medication Sig Dispense Refill    ibuprofen (ADVIL;MOTRIN) 600 MG tablet Take 600 mg by mouth every 6 hours as needed for Pain      LORazepam (ATIVAN) 1 MG tablet Take 1 mg by mouth every 6 hours as needed for Anxiety.       tamsulosin (FLOMAX) 0.4 MG capsule Take 1 capsule by mouth daily for 5 doses 5 capsule 0    nicotine (NICODERM CQ) 14 MG/24HR Place 1 patch onto the skin daily 30 patch 3    famotidine (PEPCID) 20 MG tablet Take 1 tablet by mouth 2 times daily (Patient not taking: Reported on 2022) 60 tablet 3       Current Meds  No current facility-administered medications for this encounter. Family History:   History reviewed. No pertinent family history. Social History:   TOBACCO:   reports that she has been smoking. She has been smoking an average of 1 pack per day. She has never used smokeless tobacco.  ETOH:   reports current alcohol use. DRUGS:   reports no history of drug use. ROS: A 14 point review of systems was conducted, significant findings as noted in HPI. All other systems negative. Physical exam:    Vitals:    10/03/22 1341 10/03/22 1343 10/03/22 1539   BP: 126/86  130/72   Pulse: 84     Resp: 17     Temp: 97.8 °F (36.6 °C)     TempSrc: Oral     SpO2: 95%  96%   Weight:  158 lb 15.2 oz (72.1 kg)    Height:  5' 3.5\" (1.613 m)        General appearance: alert, no acute distress, grooming appropriate  Eyes: PERRLA, no scleral icterus  Neck: trachea midline, no JVD, no lymphadenopathy  Chest/Lungs: CTAB, no crackles/rales, wheezes/rhonchi, normal effort  Cardiovascular: RRR, no murmurs/gallops/rubs  Abdomen: soft, mildly tender in epigastric area, non-distended, no guarding/rigidity. Previous infraumbilical midline incision scar noted. : Grossly normal  Skin: warm and dry, no rashes  Extremities: no edema, no cyanosis  Neuro: A&Ox3, no focal deficits, sensation intact    Labs:    CBC:   Recent Labs     10/03/22  1356   WBC 8.3   HGB 15.1   HCT 45.4   MCV 98.9        BMP:   Recent Labs     10/03/22  1356      K 3.2*      CO2 24   BUN 19   CREATININE 0.8     PT/INR: No results for input(s): PROTIME, INR in the last 72 hours. APTT: No results for input(s): APTT in the last 72 hours.   Liver Profile:  Lab Results   Component Value Date/Time    AST 33 10/03/2022 01:56 PM    ALT 49 10/03/2022 01:56 PM    BILIDIR <0.2 04/21/2022 08:35 AM    BILITOT 0.7 10/03/2022 01:56 PM    ALKPHOS 119 10/03/2022 01:56 PM   No results found for: CHOL, HDL, TRIG  UA:   Lab Results   Component Value Date/Time    COLORU Yellow 10/03/2022 01:56 PM    PHUR 6.0 10/03/2022 01:56 PM    WBCUA 3-5 10/03/2022 01:56 PM    RBCUA 0-2 10/03/2022 01:56 PM    MUCUS 1+ 10/03/2022 01:56 PM    BACTERIA Rare 10/03/2022 01:56 PM    CLARITYU Clear 10/03/2022 01:56 PM    SPECGRAV 1.025 10/03/2022 01:56 PM    LEUKOCYTESUR TRACE 10/03/2022 01:56 PM    UROBILINOGEN 0.2 10/03/2022 01:56 PM    BILIRUBINUR Negative 10/03/2022 01:56 PM    BLOODU LARGE 10/03/2022 01:56 PM    GLUCOSEU Negative 10/03/2022 01:56 PM       Imaging:   CT ABDOMEN PELVIS W IV CONTRAST Additional Contrast? None   Final Result      1. No acute findings. 2.  1.7 cm fluid density cystic lesion inseparable from the tail of the appendix suspicious for mucocele. Surgical consultation recommended. Assessment/Plan: This is a 62 y.o. female with resolving epigastric abdominal discomfort. On physical exam, patient is afebrile and hemodynamically stable. Abdominal exam is benign. No right lower quadrant or periumbilical pain. Labs within normal limits. CT scans were reviewed which shows distal appendiceal mass, most likely mucosal.  There is no signs of appendicitis. Patient would benefit from elective appendectomy and pathology evaluation of this appendiceal mass. Appendectomy during this visit was offered, however patient stated she is not able to proceed with surgery at this time due to her social issue. She prefers to follow-up with her primary care doctor and with general surgery as outpatient. Follow-up instruction with Dr. Trent Basurto was given. Discussed with Dr. Trent Basurto and ED staff.         Manan Adam MD  General Surgery Resident  10/03/22  5:20 PM  [phone]

## 2022-10-05 ENCOUNTER — INITIAL CONSULT (OUTPATIENT)
Dept: BARIATRICS/WEIGHT MGMT | Age: 57
End: 2022-10-05
Payer: COMMERCIAL

## 2022-10-05 VITALS
SYSTOLIC BLOOD PRESSURE: 133 MMHG | DIASTOLIC BLOOD PRESSURE: 78 MMHG | BODY MASS INDEX: 27.31 KG/M2 | HEART RATE: 89 BPM | WEIGHT: 160 LBS | HEIGHT: 64 IN

## 2022-10-05 DIAGNOSIS — Z72.0 TOBACCO USE: ICD-10-CM

## 2022-10-05 DIAGNOSIS — K38.8 MUCOCELE OF APPENDIX: Primary | ICD-10-CM

## 2022-10-05 PROCEDURE — 3017F COLORECTAL CA SCREEN DOC REV: CPT | Performed by: SURGERY

## 2022-10-05 PROCEDURE — 4004F PT TOBACCO SCREEN RCVD TLK: CPT | Performed by: SURGERY

## 2022-10-05 PROCEDURE — G8484 FLU IMMUNIZE NO ADMIN: HCPCS | Performed by: SURGERY

## 2022-10-05 PROCEDURE — 99204 OFFICE O/P NEW MOD 45 MIN: CPT | Performed by: SURGERY

## 2022-10-05 PROCEDURE — G8419 CALC BMI OUT NRM PARAM NOF/U: HCPCS | Performed by: SURGERY

## 2022-10-05 PROCEDURE — G8427 DOCREV CUR MEDS BY ELIG CLIN: HCPCS | Performed by: SURGERY

## 2022-10-05 RX ORDER — OMEPRAZOLE 20 MG/1
20 CAPSULE, DELAYED RELEASE ORAL
Qty: 60 CAPSULE | Refills: 2 | Status: SHIPPED | OUTPATIENT
Start: 2022-10-05 | End: 2022-10-17

## 2022-10-05 NOTE — PROGRESS NOTES
[address]  Physicians   General & Laparoscopic Surgery  [employer]       HPI:    Amrit Alfonso is very pleasant 62 y.o. 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 Incidental finding on CT A/P when in ED for upper abdominal pain. Has grown in size when compared to prior CT. 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 PMH: kidney stones  Past Surgical History:   Procedure Laterality Date     SECTION      CYSTOSCOPY INSERTION / REMOVAL STENT / STONE Left 2020    CYSTOSCOPY, BILATERAL RETROGRADE PYLOGRAM; ATTEMPTED LEFT URETEROSCOPY; EVACUATION OF CLOT FROM BLADDER; LEFT STENT PLACEMENT performed by David Lorenzana MD at [address]     No family history on file. Social History     Tobacco Use    Smoking status: Every Day     Packs/day: 1.00     Types: Cigarettes    Smokeless tobacco: Never   Substance Use Topics    Alcohol use: Yes     Comment: 3-4 times a week     I counseled the patient on the importance of not smoking and risks of ETOH. No Known Allergies  Vitals:    10/05/22 1117   BP: 133/78   Pulse: 89   Weight: 160 lb (72.6 kg)   Height: 5' 3.5\" (1.613 m)       Body mass index is 27.9 kg/m². Current Outpatient Medications:     omeprazole (PRILOSEC) 20 MG delayed release capsule, Take 1 capsule by mouth 2 times daily (before meals), Disp: 60 capsule, Rfl: 2    LORazepam (ATIVAN) 1 MG tablet, Take 1 mg by mouth every 6 hours as needed for Anxiety. , Disp: , Rfl:     tamsulosin (FLOMAX) 0.4 MG capsule, Take 1 capsule by mouth daily for 5 doses, Disp: 5 capsule, Rfl: 0      Review of Systems - History obtained from the patient  General ROS: negative  Psychological ROS: negative  Ophthalmic ROS: negative  Neurological ROS: negative  ENT ROS: negative  Allergy and Immunology ROS: negative  Hematological and Lymphatic ROS: negative  Endocrine ROS: negative  Respiratory ROS: negative  Cardiovascular ROS: negative  Gastrointestinal ROS: negative  Genito-Urinary ROS: negative  Musculoskeletal ROS: negative   Skin ROS: negative    Physical Exam   Constitutional: Patient is oriented to person, place, and time. Vital signs are normal. Patient  appears well-developed and well-nourished. Patient  is active and cooperative. Non-toxic appearance. No distress. HENT:   Head: Normocephalic and atraumatic. Head is without laceration. Right Ear: External ear normal. No lacerations. No drainage, swelling or tenderness. Left Ear: External ear normal. No lacerations. No drainage, swelling or tenderness. Nose: Nose normal. No nose lacerations or nasal deformity. Mouth/Throat: Uvula is midline, oropharynx is clear and moist and mucous membranes are normal. No oropharyngeal exudate. Eyes: Conjunctivae, EOM and lids are normal. Pupils are equal, round, and reactive to light. Right eye exhibits no discharge. No foreign body present in the right eye. Left eye exhibits no discharge. No foreign body present in the left eye. No scleral icterus. Neck: Trachea normal and normal range of motion. Neck supple. No JVD present. No tracheal tenderness present. Carotid bruit is not present. No rigidity. No tracheal deviation and no edema present. No thyromegaly present. Cardiovascular: Normal rate, regular rhythm, normal heart sounds, intact distal pulses and normal pulses. Pulmonary/Chest: Effort normal and breath sounds normal. No stridor. No respiratory distress. Patient  has no wheezes. Patient has no rales. Patient exhibits no tenderness and no crepitus. Abdominal: Soft. Normal appearance and bowel sounds are normal. Patient exhibits no distension, no abdominal bruit, no ascites and no mass. There is no hepatosplenomegaly. There is no tenderness. There is no rigidity, no rebound, no guarding and no CVA tenderness. Musculoskeletal: Normal range of motion.  Patient exhibits no edema or tenderness. Neurological: Patient is alert and oriented to person, place, and time. Patient has normal strength. Coordination and gait normal. GCS eye subscore is 4. GCS verbal subscore is 5. GCS motor subscore is 6. Skin: Skin is warm and dry. No abrasion and no rash noted. Patient  is not diaphoretic. No cyanosis or erythema. Psychiatric: Patient has a normal mood and affect.   speech is normal and behavior is normal. Cognition and memory are normal.         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

## 2022-10-14 ENCOUNTER — ANESTHESIA EVENT (OUTPATIENT)
Dept: OPERATING ROOM | Age: 57
End: 2022-10-14
Payer: COMMERCIAL

## 2022-10-17 ENCOUNTER — TELEPHONE (OUTPATIENT)
Dept: BARIATRICS/WEIGHT MGMT | Age: 57
End: 2022-10-17

## 2022-10-17 NOTE — TELEPHONE ENCOUNTER
Called pt to confirm her arrival for surgery. Pt was scheduled later in the day, but needed the earlier arrival time.  Pt was switched to an arrival of 5:30 am. Pt reminded to be NPO 8 hours prior to arrival.

## 2022-10-17 NOTE — PROGRESS NOTES
Place patient label inside box (if no patient label, complete below)  Name:  :  MR#:   Gumaro Patel / PROCEDURE  I (we)[place], [address] (Patient Name) authorize Alvaro Read DO (Provider / Tiffanie Quiñones) and/or such assistants as may be selected by him/her, to perform the following operation/procedure(s): LAPAROSCOPIC APPENDECTOMY       Note: If unable to obtain consent prior to an emergent procedure, document the emergent reason in the medical record. This procedure has been explained to my (our) satisfaction and included in the explanation was: The intended benefit, nature, and extent of the procedure to be performed; The significant risks involved and the probability of success; Alternative procedures and methods of treatment; The dangers and probable consequences of such alternatives (including no procedure or treatment); The expected consequences of the procedure on my future health; Whether other qualified individuals would be performing important surgical tasks and/or whether  would be present to advise or support the procedure. I (we) understand that there are other risks of infection and other serious complications in the pre-operative/procedural and postoperative/procedural stages of my (our) care. I (we) have asked all of the questions which I (we) thought were important in deciding whether or not to undergo treatment or diagnosis. These questions have been answered to my (our) satisfaction. I (we) understand that no assurance can be given that the procedure will be a success, and no guarantee or warranty of success has been given to me (us). It has been explained to me (us) that during the course of the operation/procedure, unforeseen conditions may be revealed that necessitate extension of the original procedure(s) or different procedure(s) than those set forth in Paragraph 1.  I (we) authorize and request that the above-named physician, his/her assistants or his/her designees, perform procedures as necessary and desirable if deemed to be in my (our) best interest.     Revised 8/2/2021                                                                          Page 1 of 2             I acknowledge that health care personnel may be observing this procedure for the purpose of medical education or other specified purposes as may be necessary as requested and/or approved by my (our) physician. I (we) consent to the disposal by the hospital Pathologist of the removed tissue, parts or organs in accordance with hospital policy. I do ____ do not ____ consent to the use of a local infiltration pain blocking agent that will be used by my provider/surgical provider to help alleviate pain during my procedure. I do ____ do not ____ consent to an emergent blood transfusion in the case of a life-threatening situation that requires blood components to be administered. This consent is valid for 24 hours from the beginning of the procedure. This patient does ____ or does not ____ currently have a DNR status/order. If DNR order is in place, obtain Addendum to the Surgical Consent for ALL Patients with a DNR Order to address ana-operative status for limited intervention or DNR suspension.      I have read and fully understand the above Consent for Operation/Procedure and that all blanks were completed before I signed the consent.   _____________________________       _____________________      ____/____am/pm  Signature of Patient or legal representative      Printed Name / Relationship            Date / Time   ____________________________       _____________________      ____/____am/pm  Witness to Signature                                    Printed Name                    Date / Time    If patient is unable to sign or is a minor, complete the following)  Patient is a minor, ____ years of age, or unable to sign because: ______________________________________________________________________________________________    If a phone consent is obtained, consent will be documented by using two health care professionals, each affirming that the consenting party has no questions and gives consent for the procedure discussed with the physician/provider.   _____________________          ____________________       _____/_____am/pm   2nd witness to phone consent        Printed name           Date / Time    Informed Consent:  I have provided the explanation described above in section 1 to the patient and/or legal representative.  I have provided the patient and/or legal representative with an opportunity to ask any questions about the proposed operation/procedure.   ___________________________          ____________________         ____/____am/pm  Provider / Proceduralist                            Printed name            Date / Time  Revised 8/2/2021                                                                      Page 2 of 2

## 2022-10-17 NOTE — ANESTHESIA PRE PROCEDURE
Department of Anesthesiology  Preprocedure Note       Name:  Alejandro Mejia   Age:  62 y.o.  :  1965                                          MRN:  [id number]         Date:  10/17/2022      Surgeon: Michael Julian): Cherylene Glee, DO    Procedure: Procedure(s):  LAPAROSCOPIC APPENDECTOMY    Medications prior to admission:   Prior to Admission medications    Medication Sig Start Date End Date Taking? Authorizing Provider   LORazepam (ATIVAN) 1 MG tablet Take 1 mg by mouth every 6 hours as needed for Anxiety. Historical Provider, MD       Current medications:    No current facility-administered medications for this encounter. Current Outpatient Medications   Medication Sig Dispense Refill    LORazepam (ATIVAN) 1 MG tablet Take 1 mg by mouth every 6 hours as needed for Anxiety.          Allergies:  No Known Allergies    Problem List:    Patient Active Problem List   Diagnosis Code    Flank pain R10.9       Past Medical History:        Diagnosis Date    Anxiety     PONV (postoperative nausea and vomiting)        Past Surgical History:        Procedure Laterality Date     SECTION      CYSTOSCOPY INSERTION / REMOVAL STENT / STONE Left 2020    CYSTOSCOPY, BILATERAL RETROGRADE PYLOGRAM; ATTEMPTED LEFT URETEROSCOPY; EVACUATION OF CLOT FROM BLADDER; LEFT STENT PLACEMENT performed by Anuj Holland MD at [address]  History:    Social History     Tobacco Use    Smoking status: Every Day     Packs/day: 1.00     Types: Cigarettes    Smokeless tobacco: Never   Substance Use Topics    Alcohol use: Yes     Comment: 3-4 times a week                                Ready to quit: Not Answered  Counseling given: Not Answered      Vital Signs (Current):   Vitals:    10/17/22 0941   Weight: 159 lb (72.1 kg)                                              BP Readings from Last 3 Encounters:   10/05/22 133/78   10/03/22 130/72   22 (!) 148/72       NPO Status: BMI:   Wt Readings from Last 3 Encounters:   10/05/22 160 lb (72.6 kg)   10/03/22 158 lb 15.2 oz (72.1 kg)   04/21/22 154 lb (69.9 kg)     Body mass index is 27.72 kg/m². CBC:   Lab Results   Component Value Date/Time    WBC 8.3 10/03/2022 01:56 PM    RBC 4.60 10/03/2022 01:56 PM    HGB 15.1 10/03/2022 01:56 PM    HCT 45.4 10/03/2022 01:56 PM    MCV 98.9 10/03/2022 01:56 PM    RDW 14.0 10/03/2022 01:56 PM     10/03/2022 01:56 PM       CMP:   Lab Results   Component Value Date/Time     10/03/2022 01:56 PM    K 3.2 10/03/2022 01:56 PM     10/03/2022 01:56 PM    CO2 24 10/03/2022 01:56 PM    BUN 19 10/03/2022 01:56 PM    CREATININE 0.8 10/03/2022 01:56 PM    GFRAA >60 10/03/2022 01:56 PM    AGRATIO 1.8 10/03/2022 01:56 PM    LABGLOM >60 10/03/2022 01:56 PM    GLUCOSE 108 10/03/2022 01:56 PM    PROT 7.0 10/03/2022 01:56 PM    CALCIUM 9.3 10/03/2022 01:56 PM    BILITOT 0.7 10/03/2022 01:56 PM    ALKPHOS 119 10/03/2022 01:56 PM    AST 33 10/03/2022 01:56 PM    ALT 49 10/03/2022 01:56 PM       POC Tests: No results for input(s): POCGLU, POCNA, POCK, POCCL, POCBUN, POCHEMO, POCHCT in the last 72 hours. Coags:   Lab Results   Component Value Date/Time    PROTIME 11.8 09/29/2020 10:04 PM    INR 1.02 09/29/2020 10:04 PM       HCG (If Applicable):   Lab Results   Component Value Date    PREGTESTUR Negative 11/02/2018        ABGs: No results found for: PHART, PO2ART, GNW2GWR, NUN4NNJ, BEART, U2OTLZLT     Type & Screen (If Applicable):  No results found for: LABABO, LABRH    Drug/Infectious Status (If Applicable):  No results found for: HIV, HEPCAB    COVID-19 Screening (If Applicable): No results found for: COVID19        Anesthesia Evaluation  Patient summary reviewed and Nursing notes reviewed   history of anesthetic complications: PONV.   Airway: Mallampati: II  TM distance: >3 FB   Neck ROM: full  Mouth opening: > = 3 FB   Dental: normal exam Pulmonary:Negative Pulmonary ROS and normal exam                               Cardiovascular:Negative CV ROS            Rhythm: regular  Rate: normal                    Neuro/Psych:   Negative Neuro/Psych ROS              GI/Hepatic/Renal: Neg GI/Hepatic/Renal ROS            Endo/Other: Negative Endo/Other ROS                    Abdominal:             Vascular: negative vascular ROS. Other Findings:           Anesthesia Plan      general     ASA 2     (Procedure being performed because of mucocele of appendix.  )  Induction: intravenous. Anesthetic plan and risks discussed with patient. Plan discussed with CRNA.                     Meenakshi Taylor MD   10/17/2022

## 2022-10-17 NOTE — PROGRESS NOTES
[facility] PRE-SURGICAL TESTING INSTRUCTIONS                      PRIOR TO PROCEDURE DATE:    1. PLEASE FOLLOW ANY INSTRUCTIONS GIVEN TO YOU PER YOUR SURGEON. 2. Arrange for someone to drive you home and be with you for the first 24 hours after discharge for your safety after your procedure for which you received sedation. Ensure it is someone we can share information with regarding your discharge. NOTE: At this time ONLY 2 ADULTS may accompany you & everyone must be MASKED. 3. You must contact your surgeon for instructions IF:  You are taking any blood thinners, aspirin, anti-inflammatory or vitamins. There is a change in your physical condition such as a cold, fever, rash, cuts, sores, or any other infection, especially near your surgical site. 4. Do not drink alcohol the day before or day of your procedure. Do not use any recreational marijuana at least 24 hours or street drugs (heroin, cocaine) at minimum 5 days prior to your procedure. 5. A Pre-Surgical History and Physical MUST be completed WITHIN 30 DAYS OR LESS prior to your procedure. by your Physician or an Urgent Care        THE DAY OF YOUR PROCEDURE:  1. Follow instructions for ARRIVAL TIME as DIRECTED BY YOUR SURGEON. 2. Enter the MAIN entrance from [address] and follow the signs to the free Parking [employer] or [employer] (offered free of charge 7 am-5pm). 3. Enter the Main Entrance of the hospital (do not enter from the lower level of the parking garage). Upon entrance, check in with the  at the surgical information desk on your LEFT. Bring your insurance card and photo ID to register      4. DO NOT EAT ANYTHING 8 hours prior to arrival for surgery. You may have up to 8 ounces of water 4 hours prior to your arrival for surgery.    NOTE: ALL Gastric, Bariatric & Bowel surgery patients - you MUST follow your surgeon's instructions regarding eating/ drinking as you will have very specific instructions to follow. If you did not receive these, call your surgeon's office immediately. 5. MEDICATIONS:  Take the following medications with a SMALL sip of water: ativan  Use your usual dose of inhalers the morning of surgery. BRING your rescue inhaler with you to hospital.   Anesthesia does NOT want you to take insulin the morning of surgery. They will control your blood sugar while you are at the hospital. Please contact your ordering physician for instructions regarding your insulin the night before your procedure. If you have an insulin pump, please keep it set on basal rate. Bariatric patient's call your surgeon if on diabetic medications as some may need to be stopped 1 week prior to surgery    6. Do not swallow additional water when brushing teeth. No gum, candy, mints, or ice chips. Refrain from smoking or at least decrease the amount on day of surgery. 7. Morning of surgery:   Take a shower with an antibacterial soap (i.e., Safeguard or Dial) OR your physician may have instructed you to use Hibiclens. Dress in loose, comfortable clothing appropriate for redressing after your procedure. Do not wear jewelry (including body piercings), make-up (especially NO eye make-up), fingernail polish (NO toenail polish if foot/leg surgery), lotion, powders, or metal hairclips. Do not shave or wax for 72 hours prior to procedure near your operative site. Shaving with a razor can irritate your skin and make it easier to develop an infection. On the day of your procedure, any hair that needs to be removed near the surgical site will be 'clipped' by a healthcare worker using a special clipper designed to avoid skin irritation. 8. Dentures, glasses, or contacts will need to be removed before your procedure. Bring cases for your glasses, contacts, dentures, or hearing aids to protect them while you are in surgery. 9. If you use a CPAP, please bring it with you on the day of your procedure.     10. We recommend that valuable personal belongings such as cash, cell phones, e-tablets, or jewelry, be left at home during your stay. The hospital will not be responsible for valuables that are not secured in the hospital safe. However, if your insurance requires a co-pay, you may want to bring a method of payment, i.e., Check or credit card, if you wish to pay your co-pay the day of surgery. 11. If you are to stay overnight, you may bring a bag with personal items. Please have any large items you may need brought in by your family after your arrival to your hospital room. 12. If you have a Living Will or Durable Power of , please bring a copy on the day of your procedure. How we keep you safe and work to prevent surgical site infections:   1. Health care workers should always check your ID bracelet to verify your name and birth date. You will be asked many times to state your name, date of birth, and allergies. 2. Health care workers should always clean their hands with soap or alcohol gel before providing care to you. It is okay to ask anyone if they cleaned their hands before they touch you. 3. You will be actively involved in verifying the type of procedure you are having and ensuring the correct surgical site. This will be confirmed multiple times prior to your procedure. Do NOT akila your surgery site UNLESS instructed to by your surgeon. 4. When you are in the operating room, your surgical site will be cleansed with a special soap, and in most cases, you will be given an antibiotic before the surgery begins. What to expect AFTER your procedure? 1. Immediately following your procedure, your will be taken to the PACU for the first phase of your recovery. Your nurse will help you recover from any potential side effects of anesthesia, such as extreme drowsiness, changes in your vital signs or breathing patterns.  Nausea, headache, muscle aches, or sore throat may also occur after

## 2022-10-18 ENCOUNTER — ANESTHESIA (OUTPATIENT)
Dept: OPERATING ROOM | Age: 57
End: 2022-10-18
Payer: COMMERCIAL

## 2022-10-18 ENCOUNTER — HOSPITAL ENCOUNTER (OUTPATIENT)
Age: 57
Setting detail: OUTPATIENT SURGERY
Discharge: HOME | End: 2022-10-18
Attending: SURGERY | Admitting: SURGERY
Payer: COMMERCIAL

## 2022-10-18 VITALS
OXYGEN SATURATION: 95 % | TEMPERATURE: 98.4 F | RESPIRATION RATE: 15 BRPM | HEIGHT: 63 IN | WEIGHT: 157.8 LBS | HEART RATE: 76 BPM | SYSTOLIC BLOOD PRESSURE: 131 MMHG | BODY MASS INDEX: 27.96 KG/M2 | DIASTOLIC BLOOD PRESSURE: 77 MMHG

## 2022-10-18 DIAGNOSIS — K38.8 MUCOCELE OF APPENDIX: ICD-10-CM

## 2022-10-18 PROBLEM — K66.8 MASS OF PERITONEUM: Status: ACTIVE | Noted: 2022-10-18

## 2022-10-18 PROCEDURE — 88305 TISSUE EXAM BY PATHOLOGIST: CPT

## 2022-10-18 PROCEDURE — 2580000003 HC RX 258: Performed by: ANESTHESIOLOGY

## 2022-10-18 PROCEDURE — 2720000010 HC SURG SUPPLY STERILE: Performed by: SURGERY

## 2022-10-18 PROCEDURE — 2500000003 HC RX 250 WO HCPCS: Performed by: SURGERY

## 2022-10-18 PROCEDURE — 6360000002 HC RX W HCPCS

## 2022-10-18 PROCEDURE — 3600000014 HC SURGERY LEVEL 4 ADDTL 15MIN: Performed by: SURGERY

## 2022-10-18 PROCEDURE — 2709999900 HC NON-CHARGEABLE SUPPLY: Performed by: SURGERY

## 2022-10-18 PROCEDURE — 88304 TISSUE EXAM BY PATHOLOGIST: CPT

## 2022-10-18 PROCEDURE — 44970 LAPAROSCOPY APPENDECTOMY: CPT | Performed by: SURGERY

## 2022-10-18 PROCEDURE — 6360000002 HC RX W HCPCS: Performed by: FAMILY MEDICINE

## 2022-10-18 PROCEDURE — 7100000000 HC PACU RECOVERY - FIRST 15 MIN: Performed by: SURGERY

## 2022-10-18 PROCEDURE — 6360000002 HC RX W HCPCS: Performed by: SURGERY

## 2022-10-18 PROCEDURE — 3700000000 HC ANESTHESIA ATTENDED CARE: Performed by: SURGERY

## 2022-10-18 PROCEDURE — 7100000010 HC PHASE II RECOVERY - FIRST 15 MIN: Performed by: SURGERY

## 2022-10-18 PROCEDURE — 3600000004 HC SURGERY LEVEL 4 BASE: Performed by: SURGERY

## 2022-10-18 PROCEDURE — 2580000003 HC RX 258: Performed by: SURGERY

## 2022-10-18 PROCEDURE — 7100000001 HC PACU RECOVERY - ADDTL 15 MIN: Performed by: SURGERY

## 2022-10-18 PROCEDURE — 2500000003 HC RX 250 WO HCPCS

## 2022-10-18 PROCEDURE — 49321 LAPAROSCOPY BIOPSY: CPT | Performed by: SURGERY

## 2022-10-18 PROCEDURE — 7100000011 HC PHASE II RECOVERY - ADDTL 15 MIN: Performed by: SURGERY

## 2022-10-18 PROCEDURE — 3700000001 HC ADD 15 MINUTES (ANESTHESIA): Performed by: SURGERY

## 2022-10-18 PROCEDURE — 88112 CYTOPATH CELL ENHANCE TECH: CPT

## 2022-10-18 PROCEDURE — 6370000000 HC RX 637 (ALT 250 FOR IP): Performed by: FAMILY MEDICINE

## 2022-10-18 PROCEDURE — L0450 TLSO FLEX TRUNK/THOR PRE OTS: HCPCS | Performed by: SURGERY

## 2022-10-18 RX ORDER — ONDANSETRON 2 MG/ML
4 INJECTION INTRAMUSCULAR; INTRAVENOUS
Status: DISCONTINUED | OUTPATIENT
Start: 2022-10-18 | End: 2022-10-18 | Stop reason: HOSPADM

## 2022-10-18 RX ORDER — PROPOFOL 10 MG/ML
INJECTION, EMULSION INTRAVENOUS PRN
Status: DISCONTINUED | OUTPATIENT
Start: 2022-10-18 | End: 2022-10-18 | Stop reason: SDUPTHER

## 2022-10-18 RX ORDER — BUPIVACAINE HYDROCHLORIDE 5 MG/ML
INJECTION, SOLUTION EPIDURAL; INTRACAUDAL PRN
Status: DISCONTINUED | OUTPATIENT
Start: 2022-10-18 | End: 2022-10-18 | Stop reason: HOSPADM

## 2022-10-18 RX ORDER — SODIUM CHLORIDE 9 MG/ML
25 INJECTION, SOLUTION INTRAVENOUS PRN
Status: DISCONTINUED | OUTPATIENT
Start: 2022-10-18 | End: 2022-10-18 | Stop reason: HOSPADM

## 2022-10-18 RX ORDER — PROCHLORPERAZINE EDISYLATE 5 MG/ML
5 INJECTION INTRAMUSCULAR; INTRAVENOUS
Status: DISCONTINUED | OUTPATIENT
Start: 2022-10-18 | End: 2022-10-18 | Stop reason: HOSPADM

## 2022-10-18 RX ORDER — ROCURONIUM BROMIDE 10 MG/ML
INJECTION, SOLUTION INTRAVENOUS PRN
Status: DISCONTINUED | OUTPATIENT
Start: 2022-10-18 | End: 2022-10-18 | Stop reason: SDUPTHER

## 2022-10-18 RX ORDER — DIPHENHYDRAMINE HYDROCHLORIDE 50 MG/ML
12.5 INJECTION INTRAMUSCULAR; INTRAVENOUS
Status: DISCONTINUED | OUTPATIENT
Start: 2022-10-18 | End: 2022-10-18 | Stop reason: HOSPADM

## 2022-10-18 RX ORDER — SUCCINYLCHOLINE/SOD CL,ISO/PF 200MG/10ML
SYRINGE (ML) INTRAVENOUS PRN
Status: DISCONTINUED | OUTPATIENT
Start: 2022-10-18 | End: 2022-10-18 | Stop reason: SDUPTHER

## 2022-10-18 RX ORDER — OXYCODONE HYDROCHLORIDE 5 MG/1
5 TABLET ORAL EVERY 6 HOURS PRN
Qty: 28 TABLET | Refills: 0 | Status: SHIPPED | OUTPATIENT
Start: 2022-10-18 | End: 2022-10-20

## 2022-10-18 RX ORDER — LABETALOL HYDROCHLORIDE 5 MG/ML
10 INJECTION, SOLUTION INTRAVENOUS
Status: DISCONTINUED | OUTPATIENT
Start: 2022-10-18 | End: 2022-10-18 | Stop reason: HOSPADM

## 2022-10-18 RX ORDER — APREPITANT 40 MG/1
40 CAPSULE ORAL ONCE
Status: COMPLETED | OUTPATIENT
Start: 2022-10-18 | End: 2022-10-18

## 2022-10-18 RX ORDER — SODIUM CHLORIDE 0.9 % (FLUSH) 0.9 %
5-40 SYRINGE (ML) INJECTION EVERY 12 HOURS SCHEDULED
Status: DISCONTINUED | OUTPATIENT
Start: 2022-10-18 | End: 2022-10-18 | Stop reason: HOSPADM

## 2022-10-18 RX ORDER — SODIUM CHLORIDE 0.9 % (FLUSH) 0.9 %
5-40 SYRINGE (ML) INJECTION PRN
Status: DISCONTINUED | OUTPATIENT
Start: 2022-10-18 | End: 2022-10-18 | Stop reason: HOSPADM

## 2022-10-18 RX ORDER — LORAZEPAM 2 MG/ML
0.5 INJECTION INTRAMUSCULAR
Status: DISCONTINUED | OUTPATIENT
Start: 2022-10-18 | End: 2022-10-18 | Stop reason: HOSPADM

## 2022-10-18 RX ORDER — HEPARIN SODIUM 5000 [USP'U]/ML
5000 INJECTION, SOLUTION INTRAVENOUS; SUBCUTANEOUS ONCE
Status: COMPLETED | OUTPATIENT
Start: 2022-10-18 | End: 2022-10-18

## 2022-10-18 RX ORDER — DOCUSATE SODIUM 100 MG/1
100 CAPSULE, LIQUID FILLED ORAL 2 TIMES DAILY
Qty: 30 CAPSULE | Refills: 0 | Status: SHIPPED | OUTPATIENT
Start: 2022-10-18 | End: 2022-10-27 | Stop reason: ALTCHOICE

## 2022-10-18 RX ORDER — MIDAZOLAM HYDROCHLORIDE 1 MG/ML
INJECTION INTRAMUSCULAR; INTRAVENOUS PRN
Status: DISCONTINUED | OUTPATIENT
Start: 2022-10-18 | End: 2022-10-18 | Stop reason: SDUPTHER

## 2022-10-18 RX ORDER — OXYCODONE HYDROCHLORIDE 5 MG/1
5 TABLET ORAL PRN
Status: COMPLETED | OUTPATIENT
Start: 2022-10-18 | End: 2022-10-18

## 2022-10-18 RX ORDER — MEPERIDINE HYDROCHLORIDE 25 MG/ML
12.5 INJECTION INTRAMUSCULAR; INTRAVENOUS; SUBCUTANEOUS EVERY 5 MIN PRN
Status: DISCONTINUED | OUTPATIENT
Start: 2022-10-18 | End: 2022-10-18 | Stop reason: HOSPADM

## 2022-10-18 RX ORDER — LIDOCAINE HYDROCHLORIDE 20 MG/ML
INJECTION, SOLUTION INTRAVENOUS PRN
Status: DISCONTINUED | OUTPATIENT
Start: 2022-10-18 | End: 2022-10-18 | Stop reason: SDUPTHER

## 2022-10-18 RX ORDER — SODIUM CHLORIDE 9 MG/ML
INJECTION, SOLUTION INTRAVENOUS CONTINUOUS
Status: DISCONTINUED | OUTPATIENT
Start: 2022-10-18 | End: 2022-10-18 | Stop reason: HOSPADM

## 2022-10-18 RX ORDER — SODIUM CHLORIDE 9 MG/ML
INJECTION, SOLUTION INTRAVENOUS PRN
Status: DISCONTINUED | OUTPATIENT
Start: 2022-10-18 | End: 2022-10-18 | Stop reason: HOSPADM

## 2022-10-18 RX ORDER — FENTANYL CITRATE 50 UG/ML
25 INJECTION, SOLUTION INTRAMUSCULAR; INTRAVENOUS EVERY 5 MIN PRN
Status: DISCONTINUED | OUTPATIENT
Start: 2022-10-18 | End: 2022-10-18 | Stop reason: HOSPADM

## 2022-10-18 RX ORDER — DEXAMETHASONE SODIUM PHOSPHATE 4 MG/ML
INJECTION, SOLUTION INTRA-ARTICULAR; INTRALESIONAL; INTRAMUSCULAR; INTRAVENOUS; SOFT TISSUE PRN
Status: DISCONTINUED | OUTPATIENT
Start: 2022-10-18 | End: 2022-10-18 | Stop reason: SDUPTHER

## 2022-10-18 RX ORDER — SCOLOPAMINE TRANSDERMAL SYSTEM 1 MG/1
1 PATCH, EXTENDED RELEASE TRANSDERMAL ONCE
Status: DISCONTINUED | OUTPATIENT
Start: 2022-10-18 | End: 2022-10-18 | Stop reason: HOSPADM

## 2022-10-18 RX ORDER — SODIUM CHLORIDE, SODIUM LACTATE, POTASSIUM CHLORIDE, CALCIUM CHLORIDE 600; 310; 30; 20 MG/100ML; MG/100ML; MG/100ML; MG/100ML
INJECTION, SOLUTION INTRAVENOUS CONTINUOUS
Status: DISCONTINUED | OUTPATIENT
Start: 2022-10-18 | End: 2022-10-18 | Stop reason: HOSPADM

## 2022-10-18 RX ORDER — ONDANSETRON 2 MG/ML
INJECTION INTRAMUSCULAR; INTRAVENOUS PRN
Status: DISCONTINUED | OUTPATIENT
Start: 2022-10-18 | End: 2022-10-18 | Stop reason: SDUPTHER

## 2022-10-18 RX ORDER — OXYCODONE HYDROCHLORIDE 5 MG/1
10 TABLET ORAL PRN
Status: COMPLETED | OUTPATIENT
Start: 2022-10-18 | End: 2022-10-18

## 2022-10-18 RX ORDER — FENTANYL CITRATE 50 UG/ML
INJECTION, SOLUTION INTRAMUSCULAR; INTRAVENOUS PRN
Status: DISCONTINUED | OUTPATIENT
Start: 2022-10-18 | End: 2022-10-18 | Stop reason: SDUPTHER

## 2022-10-18 RX ADMIN — Medication 120 MG: at 07:21

## 2022-10-18 RX ADMIN — DEXAMETHASONE SODIUM PHOSPHATE 8 MG: 4 INJECTION, SOLUTION INTRAMUSCULAR; INTRAVENOUS at 07:25

## 2022-10-18 RX ADMIN — SODIUM CHLORIDE, POTASSIUM CHLORIDE, SODIUM LACTATE AND CALCIUM CHLORIDE: 600; 310; 30; 20 INJECTION, SOLUTION INTRAVENOUS at 06:27

## 2022-10-18 RX ADMIN — LIDOCAINE HYDROCHLORIDE 50 MG: 20 INJECTION, SOLUTION INTRAVENOUS at 07:21

## 2022-10-18 RX ADMIN — FENTANYL CITRATE 25 MCG: 50 INJECTION, SOLUTION INTRAMUSCULAR; INTRAVENOUS at 09:17

## 2022-10-18 RX ADMIN — PROPOFOL 160 MG: 10 INJECTION, EMULSION INTRAVENOUS at 07:21

## 2022-10-18 RX ADMIN — OXYCODONE 5 MG: 5 TABLET ORAL at 10:04

## 2022-10-18 RX ADMIN — HEPARIN SODIUM 5000 UNITS: 5000 INJECTION INTRAVENOUS; SUBCUTANEOUS at 06:27

## 2022-10-18 RX ADMIN — SUGAMMADEX 200 MG: 100 INJECTION, SOLUTION INTRAVENOUS at 08:08

## 2022-10-18 RX ADMIN — APREPITANT 40 MG: 40 CAPSULE ORAL at 07:00

## 2022-10-18 RX ADMIN — CEFAZOLIN 2000 MG: 2 INJECTION, POWDER, FOR SOLUTION INTRAMUSCULAR; INTRAVENOUS at 07:25

## 2022-10-18 RX ADMIN — FENTANYL CITRATE 25 MCG: 50 INJECTION, SOLUTION INTRAMUSCULAR; INTRAVENOUS at 08:48

## 2022-10-18 RX ADMIN — FENTANYL CITRATE 50 MCG: 50 INJECTION, SOLUTION INTRAMUSCULAR; INTRAVENOUS at 07:21

## 2022-10-18 RX ADMIN — ROCURONIUM BROMIDE 5 MG: 10 INJECTION INTRAVENOUS at 07:21

## 2022-10-18 RX ADMIN — FENTANYL CITRATE 25 MCG: 50 INJECTION, SOLUTION INTRAMUSCULAR; INTRAVENOUS at 08:58

## 2022-10-18 RX ADMIN — MIDAZOLAM HYDROCHLORIDE 2 MG: 2 INJECTION, SOLUTION INTRAMUSCULAR; INTRAVENOUS at 07:12

## 2022-10-18 RX ADMIN — ROCURONIUM BROMIDE 45 MG: 10 INJECTION INTRAVENOUS at 07:25

## 2022-10-18 RX ADMIN — ONDANSETRON 4 MG: 2 INJECTION INTRAMUSCULAR; INTRAVENOUS at 07:25

## 2022-10-18 ASSESSMENT — PAIN SCALES - GENERAL
PAINLEVEL_OUTOF10: 2
PAINLEVEL_OUTOF10: 4
PAINLEVEL_OUTOF10: 4
PAINLEVEL_OUTOF10: 0
PAINLEVEL_OUTOF10: 6
PAINLEVEL_OUTOF10: 1
PAINLEVEL_OUTOF10: 4

## 2022-10-18 ASSESSMENT — PAIN DESCRIPTION - LOCATION
LOCATION: ABDOMEN

## 2022-10-18 ASSESSMENT — PAIN DESCRIPTION - DESCRIPTORS
DESCRIPTORS: ACHING
DESCRIPTORS: DISCOMFORT
DESCRIPTORS: ACHING
DESCRIPTORS: ACHING;BURNING
DESCRIPTORS: ACHING

## 2022-10-18 ASSESSMENT — PAIN DESCRIPTION - ORIENTATION
ORIENTATION: LEFT
ORIENTATION: LEFT
ORIENTATION: RIGHT
ORIENTATION: LEFT

## 2022-10-18 ASSESSMENT — PAIN - FUNCTIONAL ASSESSMENT: PAIN_FUNCTIONAL_ASSESSMENT: 0-10

## 2022-10-18 NOTE — PROGRESS NOTES
PACU Transfer to [facility]    Vitals:    10/18/22 0932   BP: 120/71   Pulse: 73   Resp: 16   Temp: 97.9 °F (36.6 °C)   SpO2: 93%         Intake/Output Summary (Last 24 hours) at 10/18/2022 0945  Last data filed at 10/18/2022 4700  Gross per 24 hour   Intake 400 ml   Output 60 ml   Net 340 ml       Pain assessment:  present - adequately treated  Pain Level: 2    Patient transferred to care of ISMA RN.    10/18/2022 9:45 AM

## 2022-10-18 NOTE — PROGRESS NOTES
Admitted to pacu at this time. Pt on RA. VSS on monitor. Report given by MODE and Dr. Odalis Edwards. Surgery uneventful

## 2022-10-18 NOTE — ANESTHESIA PRE PROCEDURE
Department of Anesthesiology  Preprocedure Note       Name:  Marcelino Maloney   Age:  62 y.o.  :  1965                                          MRN:  [id number]         Date:  10/18/2022      Surgeon: Suresh Benavidez): Alvaro Read DO    Procedure: Procedure(s):  LAPAROSCOPIC APPENDECTOMY    Medications prior to admission:   Prior to Admission medications    Medication Sig Start Date End Date Taking? Authorizing Provider   LORazepam (ATIVAN) 1 MG tablet Take 1 mg by mouth every 6 hours as needed for Anxiety.     Historical Provider, MD       Current medications:    Current Facility-Administered Medications   Medication Dose Route Frequency Provider Last Rate Last Admin    lactated ringers infusion   IntraVENous Continuous Heidi Spoon,  mL/hr at 10/18/22 7171 New Bag at 10/18/22 9650    sodium chloride flush 0.9 % injection 5-40 mL  5-40 mL IntraVENous 2 times per day Heidi Spoon, DO        sodium chloride flush 0.9 % injection 5-40 mL  5-40 mL IntraVENous PRN Heidi Spoon, DO        0.9 % sodium chloride infusion   IntraVENous PRN Heidi Spoon, DO        ceFAZolin (ANCEF) 2,000 mg in sodium chloride 0.9 % 50 mL IVPB (mini-bag)  2,000 mg IntraVENous Once Alvaro Read DO           Allergies:  No Known Allergies    Problem List:    Patient Active Problem List   Diagnosis Code    Flank pain R10.9       Past Medical History:        Diagnosis Date    Anxiety     PONV (postoperative nausea and vomiting)        Past Surgical History:        Procedure Laterality Date     SECTION      CYSTOSCOPY INSERTION / [address] / STONE Left 2020    CYSTOSCOPY, BILATERAL RETROGRADE PYLOGRAM; ATTEMPTED LEFT URETEROSCOPY; EVACUATION OF CLOT FROM BLADDER; LEFT STENT PLACEMENT performed by Judith John MD at [address]  History:    Social History     Tobacco Use    Smoking status: Every Day     Packs/day: 1.00     Types: Cigarettes    Smokeless tobacco: Never   Substance Use Topics    Alcohol use: Yes     Alcohol/week: 2.0 standard drinks     Types: 2 Glasses of wine per week     Comment: 3-4 times a week                                Ready to quit: Not Answered  Counseling given: Not Answered      Vital Signs (Current):   Vitals:    10/17/22 0941 10/18/22 0603   BP:  124/80   Pulse:  69   Resp:  16   Temp:  97.7 °F (36.5 °C)   TempSrc:  Temporal   SpO2:  95%   Weight: 159 lb (72.1 kg) 157 lb 12.8 oz (71.6 kg)   Height:  5' 3\" (1.6 m)                                              BP Readings from Last 3 Encounters:   10/18/22 124/80   10/05/22 133/78   10/03/22 130/72       NPO Status: Time of last liquid consumption: 2300                        Time of last solid consumption: 2300                        Date of last liquid consumption: 10/17/22                        Date of last solid food consumption: 10/17/22    BMI:   Wt Readings from Last 3 Encounters:   10/18/22 157 lb 12.8 oz (71.6 kg)   10/05/22 160 lb (72.6 kg)   10/03/22 158 lb 15.2 oz (72.1 kg)     Body mass index is 27.95 kg/m².     CBC:   Lab Results   Component Value Date/Time    WBC 8.3 10/03/2022 01:56 PM    RBC 4.60 10/03/2022 01:56 PM    HGB 15.1 10/03/2022 01:56 PM    HCT 45.4 10/03/2022 01:56 PM    MCV 98.9 10/03/2022 01:56 PM    RDW 14.0 10/03/2022 01:56 PM     10/03/2022 01:56 PM       CMP:   Lab Results   Component Value Date/Time     10/03/2022 01:56 PM    K 3.2 10/03/2022 01:56 PM     10/03/2022 01:56 PM    CO2 24 10/03/2022 01:56 PM    BUN 19 10/03/2022 01:56 PM    CREATININE 0.8 10/03/2022 01:56 PM    GFRAA >60 10/03/2022 01:56 PM    AGRATIO 1.8 10/03/2022 01:56 PM    LABGLOM >60 10/03/2022 01:56 PM    GLUCOSE 108 10/03/2022 01:56 PM    PROT 7.0 10/03/2022 01:56 PM    CALCIUM 9.3 10/03/2022 01:56 PM    BILITOT 0.7 10/03/2022 01:56 PM    ALKPHOS 119 10/03/2022 01:56 PM    AST 33 10/03/2022 01:56 PM    ALT 49 10/03/2022 01:56 PM       POC Tests: No results for input(s): POCGLU, POCNA, POCK, POCCL, POCBUN, POCHEMO, POCHCT in the last 72 hours. Coags:   Lab Results   Component Value Date/Time    PROTIME 11.8 09/29/2020 10:04 PM    INR 1.02 09/29/2020 10:04 PM       HCG (If Applicable):   Lab Results   Component Value Date    PREGTESTUR Negative 11/02/2018        ABGs: No results found for: PHART, PO2ART, KGL2LRB, FEY5JMS, BEART, L6FNZXZT     Type & Screen (If Applicable):  No results found for: LABABO, LABRH    Drug/Infectious Status (If Applicable):  No results found for: HIV, HEPCAB    COVID-19 Screening (If Applicable): No results found for: COVID19        Anesthesia Evaluation   history of anesthetic complications:   Airway: Mallampati: II  TM distance: >3 FB   Neck ROM: full  Mouth opening: > = 3 FB   Dental:          Pulmonary:                              Cardiovascular:            Rhythm: regular  Rate: normal                    Neuro/Psych:               GI/Hepatic/Renal:             Endo/Other:                     Abdominal:             Vascular: Other Findings:           Anesthesia Plan      general     ASA 2 - emergent       Induction: intravenous. Anesthetic plan and risks discussed with patient. Plan discussed with CRNA.                     Dayna Quintero MD   10/18/2022

## 2022-10-18 NOTE — BRIEF OP NOTE
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]    Post-Op Diagnosis: Same       Procedure(s):  LAPAROSCOPIC APPENDECTOMY; LAPAROSCOPIC PERITONEAL WASHINGS; PERITONEAL BIOPSY; LAPAROSCOPIC OMENTAL BIOPSY    Surgeon(s): Darcy Reeves DO    Assistant:  Resident: Emmanuel Castañeda DO; Mariana Vasques MD    Anesthesia: General    Estimated Blood Loss (mL): Minimal    Complications: None    Specimens:   ID Type Source Tests Collected by Time Destination   1 : PELVIC WASHINGS Body Fluid Fluid CYTOLOGY, NON-GYN [facility] 10/18/2022 0753    A : APPENDIX Tissue Tissue SURGICAL PATHOLOGY [facility] 10/18/2022 0751    B : OMENTAL BIOPSY Tissue Tissue SURGICAL PATHOLOGY [facility] 10/18/2022 0752    C : PERITONEAL NODULE Tissue Tissue SURGICAL PATHOLOGY [facility] 10/18/2022 7662        Implants:  * No implants in log *      Drains: * No LDAs found *    Findings: peritoneal studding in the RLQ and LLQ noted --biopsied. Non-inflamed appendix with mucocele at the distal tip, not ruptured. Studding noted in the small bowel and omentum as well --omentum was biopsied. Peritoneal washing samples was obtained. Liver surface was unremarkable.       Electronically signed by Mariana Vasques MD on 10/18/2022 at 8:12 AM

## 2022-10-18 NOTE — H&P
Cooper Case    [id number]    [facility] Same Day Surgery Update H & P  Department of General Surgery   Surgical Service   Pre-operative History and Physical  Last H & P within the last 30 days. 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 female with an incidental finding of mucocele of the appendix presents today for the above procedure. Illness Screening: Patient denies fever, chills, worsening cough, or close contact with sick individuals. Past Medical History:        Diagnosis Date    Anxiety     PONV (postoperative nausea and vomiting)      Past Surgical History:        Procedure Laterality Date     SECTION      CYSTOSCOPY INSERTION / REMOVAL STENT / STONE Left 2020    CYSTOSCOPY, BILATERAL RETROGRADE PYLOGRAM; ATTEMPTED LEFT URETEROSCOPY; EVACUATION OF CLOT FROM BLADDER; LEFT STENT PLACEMENT performed by Aris Ramirez MD at [address]       Medications Prior to Admission:      Prior to Admission medications    Medication Sig Start Date End Date Taking? Authorizing Provider   LORazepam (ATIVAN) 1 MG tablet Take 1 mg by mouth every 6 hours as needed for Anxiety. Historical Provider, MD         Allergies:  Patient has no known allergies.     PHYSICAL EXAM:      /80   Pulse 69   Temp 97.7 °F (36.5 °C) (Temporal)   Resp 16   Ht 5' 3\" (1.6 m)   Wt 157 lb 12.8 oz (71.6 kg)   SpO2 95%   BMI 27.95 kg/m²      Airway:  Airway patent with no audible stridor    Heart:  Regular rate and rhythm, No murmur noted    Lungs:  No increased work of breathing, good air exchange, clear to auscultation bilaterally, no crackles or wheezing    Abdomen:  Soft, non-distended, non-tender, no rebound tenderness or guarding, and no masses palpated    ASSESSMENT AND PLAN     Patient is a 62 y.o. female with above specified procedure planned. 1.  The patients history and physical was obtained and signed off by the pre-admission testing department. Patient seen and focused exam done today- no new changes since last physical exam on 10/12/22    2. Access to ancillary services are available per request of the provider.     MIRZA King - LAMONTE     10/18/2022

## 2022-10-18 NOTE — PROGRESS NOTES
Ambulatory Surgery/Procedure Discharge Note    Vitals:    10/18/22 0948   BP: 131/77   Pulse: 76   Resp: 15   Temp: 98.4 °F (36.9 °C)   SpO2: 95%       In: 400 [I.V.:400]  Out: 60 [Urine:50]    Restroom use offered before discharge. Yes    Pain assessment:  level of pain (1-10, 10 severe),   Pain Level: 6  Pain pill given on discharge. Patient discharged to home/self care.  Patient discharged via wheel chair by transporter to waiting family/S.O.       10/18/2022 10:20 AM

## 2022-10-18 NOTE — DISCHARGE INSTRUCTIONS
Discharge Instructions:    Diet:   You may resume a regular diet. Wound Care:   Skin glue was used to cover your incision(s). It will fall off on its own in about 10 days. You may shower, but do not scrub the incision sites directly or soak (tub, pool, etc.). Activity:   No heavy lifting greater than a milk jug until follow up. Pain management:   Unless informed of any restrictions by your primary care physician, please use your preferred over-the-counter pain reliever as your primary pain medication. If you have pain that persists despite over-the-counter pain medications, you have been provided with a prescription for an opioid/narcotic pain reliever. No driving or operating machinery while taking opioid/narcotic medications. Bowel Regimen:   Opioid/Narcotic pain relievers have a common side effect of constipation; therefore, you have been provided with a prescription for a stool softener, Docusate (Colace). These medications are intended to help prevent you from experiencing this very common side effect and also help to regulate your bowels after surgery. If your stools become too loose and/or frequent, decrease the Colace to one pill one time each day. If your stools are still loose after this modification, stop taking this medication all together. Return Precautions:   Call/ Return to ED for increased redness, worsening pain, drainage from wound, fevers, or any other concerns about your incision or post op course. Follow up with Dr. Andi Littlejohn on 10/27/22. Please call [phone] to schedule your appointment. [facility]    There are potential side effects of anesthesia or sedation you may experience for the first 24 hours. These side effects include:    Confusion or Memory loss, Dizziness, or Delayed Reaction Times   [x]A responsible person should be with you for the next 24 hours.   Do not operate any vehicles (automobiles, bicycles, motorcycles) or power tools or machinery for 24 hours. Do not sign any legal documents or make any legal decisions for 24 hours. Do not drink alcohol for 24 hours or while taking narcotic pain medication. Nausea    [x]Start with light diet and progress to your normal diet as you feel like eating. However, if you experience nausea or repeated episodes of vomiting which persist beyond 12-24 hours, notify your physician. Once nausea has passed, remember to keep drinking fluids. Difficulty Passing Urine  [x]Drink extra amounts of fluid today. Notify your physician if you have not urinated within 8 hours after your procedure or you feel uncomfortable. Irritated Throat from a Breathing Tube  [x]Drink extra amounts of fluid today. Lozenges may help. Muscle Aches  [x]You may experience some generalized body aches as your muscles recover from medications used to relax them during surgery. These will gradually subside. MEDICATION INSTRUCTIONS:  []Prescription(S) x     sent with you. Use as directed. When taking pain medications, you may experience the side effect of dizziness or drowsiness. Do not drink alcohol or drive when taking these medications. []Prescription(S) x          Called to Pharmacy Name and location:    [x]Give the list of your medications to your primary care physician on your next visit. Keep your med list updated and carry it with in case of emergencies. [x] Narcotic pain medications can cause the side effect of significant constipation. You may want to add a stool softener to your postoperative medication schedule or speak to your surgeon on how best to manage this side effect. NARCOTIC SAFETY:  Your pain medicine is only for you to take. Safely store your medicines. Store pills up high and out of reach of children and pets.   Ensure safety caps are snapped tightly  Keep track of how many pills you have left    Unused medication can be disposed of by taking them to a drop-off box or take-back program that is authorized by the [facility]. Access to a site near you can be found on the [facility] [employer] website ([address]. [facility]). If you have a CPAP machine, it is very important that you use it daily during all periods of sleep and daytime rest during your recovery at home. Surgery and Anesthesia place a significant amount of stress on your body. Using your CPAP will help keep you safe and lessen the negative effects of that stress. FOLLOW-UP RECOVERY CARE:  [x]Call the office at  for follow-up appointment and problems    Watch for these possible complications, symptoms, or side effects of anesthesia. Call physician if they or any other problems occur:  Signs of INFECTION   > Fever over 101°     > Redness, swelling, hardness or warmth at the operative site   >Foul smelling or cloudy drainage at the operative site   Unrelieved PAIN  Unrelieved NAUSEA  Blood soaked dressing. (Some oozing may be normal)  Inability to urinate      Numb, pale, blue, cold or tingling extremity      Physician: The above instructions were reviewed with patient/significant other. The following additional patient specific information was reviewed with the patient/significant other:  [x]Procedure/physician specific instructions  []Medication information sheet(S) including potential side effects  []Tias egress test  []Pain Ball management  []FAQ Catheter associated blood stream infections    FAQs  (frequently asked questions)  About Surgical Site Infections     What is a Surgical Site Infection (SSI)? A surgical site infection is an infection that occurs after surgery in the part of the body   where the surgery took place. Most patients who have surgery do not develop an   infection. However, infections develop in about 1 to 3 out of every 100 patients who  have surgery.     Some common symptoms of a surgical site infection are:   Redness and pain around the area where you had surgery  Drainage of cloudy fluid from your surgical wound   Fever     Can SSIs be treated? Yes. Most surgical site infections can be treated with antibiotics. The antibiotic given to  you depends on the bacteria (germs) causing the infection. Sometimes patients with  SSIs also need another surgery to treat the infection. What are some of the things that hospitals are doing to prevent SSIs? To prevent SSIs, doctors, nurses and other healthcare providers:   Clean their hands and arms up to their elbows with an antiseptic agent just before the surgery. Clean their hands with soap and water or an alcohol-based hand rub before and after caring for each patient. May remove some of your hair immediately before your surgery using electric clippers if the hair is in the same area where the procedure will occur. They should not shave you with a razor. Wear special hair covers, masks, gowns, and gloves during surgery to keep the surgery area clean. Give you antibiotics before your surgery starts. In most cases, you should get antibiotics within 60 minutes before the surgery starts and the antibiotics should be stopped within 24 hours after surgery. Clean the skin at the site of your surgery with a special soap that kills germs. What can I do to help prevent SSIs? Before your surgery:  Tell your doctor about other medical problems you may have. Health problems such as allergies, diabetes, and obesity could affect your surgery and your treatment. Quit smoking. Patients who smoke get more infections. Talk to your doctor about how you can quit before your surgery. Do not shave near where you will have surgery. Shaving with a razor can irritate your skin and make it easier to develop an infection. At the time of your surgery:  Speak up if someone tries to shave you with a razor before surgery. Ask why you need to be shaved and talk with your surgeon if you have any concerns. Ask if you will get antibiotics before surgery. After your surgery:  Make sure that your healthcare providers clean their hands before examining you, either with soap and water or an alcohol-based hand rub. IF YOU DO NOT SEE YOUR PROVIDERS CLEAN THEIR HANDS, PLEASE ASK THEM TO DO SO. Family and friends who visit you should not touch the surgical wound or dressings. Family and friends should clean their hands with soap and water or an alcohol-based hand rub before and after visiting you. If you do not see them clean their hands, ask them to clean their hands. What do I need to do when I go home from the hospital?  Before you go home, your doctor nurses should explain everything you need to know about taking care of your wound. Make sure you understand how to care for your wound before you leave the hospital.    Always clean your hands before and after caring for your wound. Before you go home, make sure you know who to contact if you have questions or problems after you get home. If you have any symptoms of an infection, such as redness and pain at the surgery site, drainage, or fever, call your doctor immediately. If you have additional questions, please ask your doctor or nurse. FAQ Surgical Site Infections  []Other-    I have read and understand the instructions given to me: ____________________________________________   (Patient/S.O. Signature)            Date/time 10/18/2022 9:10 AM         PACU:  [phone]   M-F 700 AM - 7 PM      SAME DAY SERVICES:  [phone] M-F 7AM-6PM        If you smoke STOP. We care about your health!

## 2022-10-18 NOTE — ANESTHESIA POSTPROCEDURE EVALUATION
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     Jamaal Phase I: Jamaal Score: 10    Jamaal Phase II: Jamaal Score: 10      Anesthesia Post Evaluation    Patient location during evaluation: PACU  Level of consciousness: awake  Complications: no  Multimodal analgesia pain management approach

## 2022-10-27 ENCOUNTER — APPOINTMENT (OUTPATIENT)
Dept: CT IMAGING | Age: 57
End: 2022-10-27
Payer: COMMERCIAL

## 2022-10-27 ENCOUNTER — OFFICE VISIT (OUTPATIENT)
Dept: BARIATRICS/WEIGHT MGMT | Age: 57
End: 2022-10-27

## 2022-10-27 ENCOUNTER — HOSPITAL ENCOUNTER (EMERGENCY)
Age: 57
Discharge: HOME | End: 2022-10-27
Attending: EMERGENCY MEDICINE
Payer: COMMERCIAL

## 2022-10-27 ENCOUNTER — APPOINTMENT (OUTPATIENT)
Dept: GENERAL RADIOLOGY | Age: 57
End: 2022-10-27
Payer: COMMERCIAL

## 2022-10-27 VITALS
SYSTOLIC BLOOD PRESSURE: 132 MMHG | HEIGHT: 63 IN | HEART RATE: 78 BPM | BODY MASS INDEX: 27.82 KG/M2 | DIASTOLIC BLOOD PRESSURE: 78 MMHG | WEIGHT: 157 LBS

## 2022-10-27 VITALS
RESPIRATION RATE: 17 BRPM | TEMPERATURE: 97.8 F | OXYGEN SATURATION: 98 % | SYSTOLIC BLOOD PRESSURE: 152 MMHG | DIASTOLIC BLOOD PRESSURE: 92 MMHG | HEART RATE: 74 BPM

## 2022-10-27 DIAGNOSIS — R07.9 CHEST PAIN, UNSPECIFIED TYPE: Primary | ICD-10-CM

## 2022-10-27 DIAGNOSIS — K38.8 MUCOCELE OF APPENDIX: Primary | ICD-10-CM

## 2022-10-27 LAB
ANION GAP SERPL CALCULATED.3IONS-SCNC: 11 MMOL/L (ref 3–16)
BASOPHILS ABSOLUTE: 0 K/UL (ref 0–0.2)
BASOPHILS RELATIVE PERCENT: 0.6 %
BUN BLDV-MCNC: 23 MG/DL (ref 7–20)
CALCIUM SERPL-MCNC: 9.7 MG/DL (ref 8.3–10.6)
CHLORIDE BLD-SCNC: 109 MMOL/L (ref 99–110)
CO2: 27 MMOL/L (ref 21–32)
CREAT SERPL-MCNC: 0.8 MG/DL (ref 0.6–1.1)
D DIMER: 0.91 UG/ML FEU (ref 0–0.6)
EKG ATRIAL RATE: 68 BPM
EKG DIAGNOSIS: NORMAL
EKG P AXIS: 63 DEGREES
EKG P-R INTERVAL: 126 MS
EKG Q-T INTERVAL: 402 MS
EKG QRS DURATION: 84 MS
EKG QTC CALCULATION (BAZETT): 427 MS
EKG R AXIS: 59 DEGREES
EKG T AXIS: 68 DEGREES
EKG VENTRICULAR RATE: 68 BPM
EOSINOPHILS ABSOLUTE: 0.1 K/UL (ref 0–0.6)
EOSINOPHILS RELATIVE PERCENT: 1.2 %
GFR SERPL CREATININE-BSD FRML MDRD: >60 ML/MIN/{1.73_M2}
GLUCOSE BLD-MCNC: 103 MG/DL (ref 70–99)
HCT VFR BLD CALC: 45.3 % (ref 36–48)
HEMOGLOBIN: 15.5 G/DL (ref 12–16)
LYMPHOCYTES ABSOLUTE: 2.5 K/UL (ref 1–5.1)
LYMPHOCYTES RELATIVE PERCENT: 30 %
MCH RBC QN AUTO: 33.6 PG (ref 26–34)
MCHC RBC AUTO-ENTMCNC: 34.2 G/DL (ref 31–36)
MCV RBC AUTO: 98.5 FL (ref 80–100)
MONOCYTES ABSOLUTE: 0.6 K/UL (ref 0–1.3)
MONOCYTES RELATIVE PERCENT: 7 %
NEUTROPHILS ABSOLUTE: 5.1 K/UL (ref 1.7–7.7)
NEUTROPHILS RELATIVE PERCENT: 61.2 %
PDW BLD-RTO: 13.8 % (ref 12.4–15.4)
PLATELET # BLD: 241 K/UL (ref 135–450)
PMV BLD AUTO: 8.2 FL (ref 5–10.5)
POTASSIUM REFLEX MAGNESIUM: 4.3 MMOL/L (ref 3.5–5.1)
RBC # BLD: 4.6 M/UL (ref 4–5.2)
SODIUM BLD-SCNC: 147 MMOL/L (ref 136–145)
TROPONIN: <0.01 NG/ML
WBC # BLD: 8.3 K/UL (ref 4–11)

## 2022-10-27 PROCEDURE — 99024 POSTOP FOLLOW-UP VISIT: CPT | Performed by: SURGERY

## 2022-10-27 PROCEDURE — 84484 ASSAY OF TROPONIN QUANT: CPT

## 2022-10-27 PROCEDURE — 80048 BASIC METABOLIC PNL TOTAL CA: CPT

## 2022-10-27 PROCEDURE — 99285 EMERGENCY DEPT VISIT HI MDM: CPT

## 2022-10-27 PROCEDURE — 85379 FIBRIN DEGRADATION QUANT: CPT

## 2022-10-27 PROCEDURE — 71260 CT THORAX DX C+: CPT | Performed by: PHYSICIAN ASSISTANT

## 2022-10-27 PROCEDURE — 71046 X-RAY EXAM CHEST 2 VIEWS: CPT

## 2022-10-27 PROCEDURE — 85025 COMPLETE CBC W/AUTO DIFF WBC: CPT

## 2022-10-27 PROCEDURE — 93005 ELECTROCARDIOGRAM TRACING: CPT | Performed by: EMERGENCY MEDICINE

## 2022-10-27 PROCEDURE — 6360000004 HC RX CONTRAST MEDICATION: Performed by: PHYSICIAN ASSISTANT

## 2022-10-27 RX ADMIN — IOPAMIDOL 75 ML: 755 INJECTION, SOLUTION INTRAVENOUS at 11:28

## 2022-10-27 ASSESSMENT — ENCOUNTER SYMPTOMS
NAUSEA: 0
VOMITING: 0
ABDOMINAL PAIN: 0
BACK PAIN: 1
SHORTNESS OF BREATH: 0
COUGH: 0
DIARRHEA: 0

## 2022-10-27 ASSESSMENT — PAIN DESCRIPTION - ORIENTATION: ORIENTATION: RIGHT

## 2022-10-27 ASSESSMENT — PAIN SCALES - GENERAL: PAINLEVEL_OUTOF10: 4

## 2022-10-27 ASSESSMENT — PAIN DESCRIPTION - LOCATION: LOCATION: CHEST;SHOULDER

## 2022-10-27 ASSESSMENT — PAIN - FUNCTIONAL ASSESSMENT: PAIN_FUNCTIONAL_ASSESSMENT: 0-10

## 2022-10-27 NOTE — ED PROVIDER NOTES
[address] [facility] 71 ENCOUNTER          PHYSICIAN ASSISTANT NOTE       Date of evaluation: 10/27/2022    Chief Complaint     Chest Pain (Woke up around 0200 feeling bad, right sided chest and shoulder pain. Worse when lying down.)      History of Present Illness     Monalisa Interiano is a 62 y.o. female with a history of kidney stones, anxiety, s/p appendectomy who comes to the emergency department today with concern for right shoulder pain radiating into the right side of his chest that has been ongoing since that she awoke this morning approximately 6 hours prior to presentation. She denies any preceding injury. She denies any similar previous symptoms. She notes that the pain also radiates into the right upper back and is exacerbated when she lies down and improves when she sits up. She denies any numbness, tingling, decreased range of motion. She denies pain elsewhere. She reports that her incision sites have been nonpainful and well-healing over the past week. She did have a follow-up appointment with her surgeon this morning and reports that the follow-up appointment went well and there have been no complications. She denies other symptoms including headache, shortness of breath, nausea, vomiting, abdominal pain, change in bowel or bladder function. Review of Systems     Review of Systems   Constitutional:  Negative for chills and fever. Respiratory:  Negative for cough and shortness of breath. Cardiovascular:  Positive for chest pain. Gastrointestinal:  Negative for abdominal pain, diarrhea, nausea and vomiting. Genitourinary:  Negative for dysuria. Musculoskeletal:  Positive for back pain. Skin:  Negative for rash. Neurological:  Negative for headaches. Past Medical, Surgical, Family, and Social History     She has a past medical history of Anxiety and PONV (postoperative nausea and vomiting).   She has a past surgical history that includes  section; CYSTOSCOPY INSERTION / REMOVAL STENT / STONE (Left, 9/30/2020); and laparoscopic appendectomy (N/A, 10/18/2022). Her family history is not on file. She reports that she has been smoking cigarettes. She has been smoking an average of 1 pack per day. She has never used smokeless tobacco. She reports current alcohol use of about 2.0 standard drinks per week. She reports that she does not use drugs. Medications     Previous Medications    LORAZEPAM (ATIVAN) 1 MG TABLET    Take 1 mg by mouth every 6 hours as needed for Anxiety. Allergies     She has No Known Allergies. Physical Exam     INITIAL VITALS: BP: 138/86, Temp: 97.8 °F (36.6 °C), Heart Rate: 69, Resp: 18, SpO2: 97 %  Physical Exam  Constitutional:       Appearance: Normal appearance. HENT:      Head: Normocephalic and atraumatic. Cardiovascular:      Rate and Rhythm: Normal rate and regular rhythm. Pulses: Normal pulses. Heart sounds: Normal heart sounds. No murmur heard. No friction rub. Pulmonary:      Effort: Pulmonary effort is normal.   Abdominal:      General: Abdomen is flat. There is no distension. Palpations: Abdomen is soft. There is no mass. Tenderness: no abdominal tenderness There is no guarding or rebound. Hernia: No hernia is present. Comments: The patient's postop incision sites are well-healing, without signs of infection including induration, fluctuance, discharge, change in color, warmth. Musculoskeletal:         General: Normal range of motion. Cervical back: Normal range of motion. Skin:     General: Skin is warm and dry. Neurological:      Mental Status: She is alert and oriented to person, place, and time. Psychiatric:         Mood and Affect: Mood normal.         Behavior: Behavior normal.       Diagnostic Results     EKG   EKG shows normal sinus rhythm at a rate of 68 bpm with isolated T wave inversions in aVL. No ST elevation or depression present. AL interval 126.   QRS 84.  . No prior viewable EKG for parison. RADIOLOGY:  XR CHEST (2 VW)    (Results Pending)       LABS:   No results found for this visit on 10/27/22. ED BEDSIDE ULTRASOUND:  No results found. RECENT VITALS:  BP: 138/86, Temp: 97.8 °F (36.6 °C), Heart Rate: 69, Resp: 18, SpO2: 97 %     Procedures         ED Course     Nursing Notes, Past Medical Hx,Past Surgical Hx, Social Hx, Allergies, and Family Hx were reviewed. ED Course as of 10/27/22 1158   Thu Oct 27, 2022   1128 D-Dimer, Quant(!): 0.91 [MW]      ED Course User Index  [MW] Arjun Paulson MD       The patient was given the following medications:  No orders of the defined types were placed in this encounter. CONSULTS:  None    MEDICAL DECISION MAKING / ASSESSMENT / [place] Eliezer is a 62 y.o. female with a history of kidney stones, anxiety, s/p appendectomy who comes to the emergency department today with concern for right shoulder pain radiating into the right side of his chest that has been ongoing since that she awoke this morning approximately 6 hours prior to presentation. She denies any preceding injury. She denies any similar previous symptoms. She notes that the pain also radiates into the right upper back and is exacerbated when she lies down and improves when she sits up. She denies any numbness, tingling, decreased range of motion. She denies pain elsewhere. She reports that her incision sites have been nonpainful and well-healing over the past week. She did have a follow-up appointment with her surgeon this morning and reports that the follow-up appointment went well and there have been no complications. She denies other symptoms including headache, shortness of breath, nausea, vomiting, abdominal pain, change in bowel or bladder function. She is alert and oriented x4. Vital signs are stable. On exam there are no discernible injuries to the patient's right upper extremity.   She has full range of motion in her right upper extremity. She has full sensation in her right upper extremity. There is no tenderness to palpation of the right shoulder, right upper arm, chest wall, neck or right side of the back. Lung sounds are clear to auscultation bilaterally. On examination of the patient's abdomen her incision sites are well-healing, without signs of infection including induration, fluctuance, discharge, change in color, warmth. Troponin is negative  CBC is unremarkable  BMP shows BUN 23 which is near baseline for this patient. D-dimer was 0.91    EKG shows normal sinus rhythm at a rate of 68 bpm with isolated T wave inversions in aVL. No ST elevation or depression present. NH interval 126. QRS 84. . No prior viewable EKG for parison. CTPA to rule out pulmonary embolism:  CT CHEST PULMONARY EMBOLISM W CONTRAST   Final Result      1. No evidence of acute or chronic pulmonary embolus. 2. Pulmonary Central lobar emphysema with upper lobe predominance, severe. XR CHEST (2 VW)   Final Result   1. No acute cardiopulmonary findings. Following CT results I did speak with the radiologist to further assess the patient's upper abdomen and there view. The radiologist informed me that the upper abdomen was visible to the level of the kidneys and had no abnormalities including abscess, fluid collection. Given the patient's CT results, unremarkable laboratory work-up including troponin and nonischemic EKG, abdominal exam, hemodynamic stability, there is low suspicion for acute cardiopulmonary or abdominal process. The patient will be discharged home with symptomatic management and strict return precautions as well as instructions to follow-up with her primary care provider. She is in agreement with this plan and expresses verbal understanding prior to discharge. This patient was also evaluated by the attending physician. All care plans were discussed and agreed upon.     Clinical Impression     1. Chest pain, unspecified type        Disposition     PATIENT REFERRED TO:  No follow-up provider specified.     DISCHARGE MEDICATIONS:  New Prescriptions    No medications on file       [facility], [place]-  10/27/22 0084

## 2022-10-27 NOTE — ED NOTES
Discharge instructions reviewed without questions. No further needs voiced at this time. Ambulatory from department in stable condition.        Reggie Doty RN  10/27/22 1934

## 2022-10-27 NOTE — ED PROVIDER NOTES
ED Attending Attestation Note     Date of evaluation: 10/27/2022    This patient was seen by the advance practice provider. I have seen and examined the patient, agree with the workup, evaluation, management and diagnosis. The care plan has been discussed. I have reviewed the ECG and concur with the RUFINO's interpretation. My assessment reveals patient with right upper chest pain radiating to right shoulder. Pain has been present since 2:30 in the morning. It seems to be worse when she lays down. She has no shortness of breath and no abdominal pain. She already was seen by her general surgeon this morning for appendectomy follow-up. On exam, the patient has no chest tenderness or abdominal tenderness. A D-dimer was obtained as part of her work-up which was otherwise normal and was elevated and for this reason a CTPA was performed which shows no identifiable cause for her chest pain such as PE or other pulmonary cause. There is no intra-abdominal cause such as free fluid in the abdomen causing Kirk's sign. Imaging was reviewed with the radiologist to be sure her intra-abdominal imaging was okay on the part that was seen and this was verified. The patient's pain will be treated symptomatically.      Mino Lowe MD  10/27/22 1594

## 2022-10-27 NOTE — DISCHARGE INSTRUCTIONS
You can take over-the-counter pain medication for discomfort. Follow-up with your primary care provider. Return to the emergency department sooner with worsening pain, pain in her abdomen, fevers, shortness of breath, blood in your stool or urine or other concerning symptoms.

## 2022-11-02 RX ORDER — CELECOXIB 200 MG/1
200 CAPSULE ORAL DAILY
Qty: 14 CAPSULE | Refills: 0 | Status: SHIPPED | OUTPATIENT
Start: 2022-11-02 | End: 2022-11-16

## 2022-11-20 NOTE — PROGRESS NOTES
Patient feels great  No N/V/F/C  Tolerating diet  Having regular BM's    Incisions C/D/I    S/P Lap appendectomy    Reviewed pathology results which show no cancer from any of the biopsy or washings    F/U PRN Cherylene Glee

## 2023-09-28 ENCOUNTER — HOSPITAL ENCOUNTER (EMERGENCY)
Age: 58
Discharge: HOME | End: 2023-09-28
Attending: EMERGENCY MEDICINE
Payer: COMMERCIAL

## 2023-09-28 ENCOUNTER — APPOINTMENT (OUTPATIENT)
Dept: CT IMAGING | Age: 58
End: 2023-09-28
Payer: COMMERCIAL

## 2023-09-28 VITALS
OXYGEN SATURATION: 96 % | RESPIRATION RATE: 18 BRPM | WEIGHT: 160 LBS | DIASTOLIC BLOOD PRESSURE: 88 MMHG | TEMPERATURE: 97.8 F | SYSTOLIC BLOOD PRESSURE: 129 MMHG | HEART RATE: 82 BPM | BODY MASS INDEX: 28.34 KG/M2

## 2023-09-28 DIAGNOSIS — N20.0 KIDNEY STONE: Primary | ICD-10-CM

## 2023-09-28 LAB
AMORPH SED URNS QL MICRO: ABNORMAL /HPF
ANION GAP SERPL CALCULATED.3IONS-SCNC: 17 MMOL/L (ref 3–16)
BACTERIA URNS QL MICRO: ABNORMAL /HPF
BASOPHILS # BLD: 0 K/UL (ref 0–0.2)
BASOPHILS NFR BLD: 0.2 %
BILIRUB UR QL STRIP.AUTO: NEGATIVE
BUN SERPL-MCNC: 23 MG/DL (ref 7–20)
CALCIUM SERPL-MCNC: 8.9 MG/DL (ref 8.3–10.6)
CHLORIDE SERPL-SCNC: 106 MMOL/L (ref 99–110)
CLARITY UR: CLEAR
CO2 SERPL-SCNC: 16 MMOL/L (ref 21–32)
COLOR UR: YELLOW
CREAT SERPL-MCNC: 0.9 MG/DL (ref 0.6–1.1)
DEPRECATED RDW RBC AUTO: 13.8 % (ref 12.4–15.4)
EOSINOPHIL # BLD: 0.1 K/UL (ref 0–0.6)
EOSINOPHIL NFR BLD: 1.1 %
EPI CELLS #/AREA URNS HPF: ABNORMAL /HPF (ref 0–5)
GFR SERPLBLD CREATININE-BSD FMLA CKD-EPI: >60 ML/MIN/{1.73_M2}
GLUCOSE SERPL-MCNC: 117 MG/DL (ref 70–99)
GLUCOSE UR STRIP.AUTO-MCNC: NEGATIVE MG/DL
HCT VFR BLD AUTO: 47.3 % (ref 36–48)
HGB BLD-MCNC: 15.4 G/DL (ref 12–16)
HGB UR QL STRIP.AUTO: ABNORMAL
KETONES UR STRIP.AUTO-MCNC: NEGATIVE MG/DL
LEUKOCYTE ESTERASE UR QL STRIP.AUTO: NEGATIVE
LYMPHOCYTES # BLD: 2.1 K/UL (ref 1–5.1)
LYMPHOCYTES NFR BLD: 14.8 %
MCH RBC QN AUTO: 33.2 PG (ref 26–34)
MCHC RBC AUTO-ENTMCNC: 32.6 G/DL (ref 31–36)
MCV RBC AUTO: 102 FL (ref 80–100)
MONOCYTES # BLD: 0.8 K/UL (ref 0–1.3)
MONOCYTES NFR BLD: 5.7 %
NEUTROPHILS # BLD: 10.9 K/UL (ref 1.7–7.7)
NEUTROPHILS NFR BLD: 78.2 %
NITRITE UR QL STRIP.AUTO: NEGATIVE
PH UR STRIP.AUTO: 6 [PH] (ref 5–8)
PLATELET # BLD AUTO: 191 K/UL (ref 135–450)
PMV BLD AUTO: 8.3 FL (ref 5–10.5)
POTASSIUM SERPL-SCNC: 5.1 MMOL/L (ref 3.5–5.1)
PROT UR STRIP.AUTO-MCNC: ABNORMAL MG/DL
RBC # BLD AUTO: 4.64 M/UL (ref 4–5.2)
RBC #/AREA URNS HPF: >100 /HPF (ref 0–4)
SODIUM SERPL-SCNC: 139 MMOL/L (ref 136–145)
SP GR UR STRIP.AUTO: 1.02 (ref 1–1.03)
UA DIPSTICK W REFLEX MICRO PNL UR: YES
URN SPEC COLLECT METH UR: ABNORMAL
UROBILINOGEN UR STRIP-ACNC: 0.2 E.U./DL
WBC # BLD AUTO: 13.9 K/UL (ref 4–11)
WBC #/AREA URNS HPF: ABNORMAL /HPF (ref 0–5)

## 2023-09-28 PROCEDURE — 81001 URINALYSIS AUTO W/SCOPE: CPT

## 2023-09-28 PROCEDURE — 96361 HYDRATE IV INFUSION ADD-ON: CPT

## 2023-09-28 PROCEDURE — 99284 EMERGENCY DEPT VISIT MOD MDM: CPT

## 2023-09-28 PROCEDURE — 6360000002 HC RX W HCPCS: Performed by: STUDENT IN AN ORGANIZED HEALTH CARE EDUCATION/TRAINING PROGRAM

## 2023-09-28 PROCEDURE — 96376 TX/PRO/DX INJ SAME DRUG ADON: CPT

## 2023-09-28 PROCEDURE — 6370000000 HC RX 637 (ALT 250 FOR IP): Performed by: EMERGENCY MEDICINE

## 2023-09-28 PROCEDURE — 6360000002 HC RX W HCPCS: Performed by: EMERGENCY MEDICINE

## 2023-09-28 PROCEDURE — 2580000003 HC RX 258: Performed by: EMERGENCY MEDICINE

## 2023-09-28 PROCEDURE — 96375 TX/PRO/DX INJ NEW DRUG ADDON: CPT

## 2023-09-28 PROCEDURE — 80048 BASIC METABOLIC PNL TOTAL CA: CPT

## 2023-09-28 PROCEDURE — 85025 COMPLETE CBC W/AUTO DIFF WBC: CPT

## 2023-09-28 PROCEDURE — 74176 CT ABD & PELVIS W/O CONTRAST: CPT

## 2023-09-28 PROCEDURE — 96374 THER/PROPH/DIAG INJ IV PUSH: CPT

## 2023-09-28 RX ORDER — FENTANYL CITRATE 50 UG/ML
50 INJECTION, SOLUTION INTRAMUSCULAR; INTRAVENOUS ONCE
Status: COMPLETED | OUTPATIENT
Start: 2023-09-28 | End: 2023-09-28

## 2023-09-28 RX ORDER — KETOROLAC TROMETHAMINE 30 MG/ML
15 INJECTION, SOLUTION INTRAMUSCULAR; INTRAVENOUS ONCE
Status: COMPLETED | OUTPATIENT
Start: 2023-09-28 | End: 2023-09-28

## 2023-09-28 RX ORDER — OXYCODONE HYDROCHLORIDE 5 MG/1
5 TABLET ORAL ONCE
Status: DISCONTINUED | OUTPATIENT
Start: 2023-09-28 | End: 2023-09-28 | Stop reason: HOSPADM

## 2023-09-28 RX ORDER — MORPHINE SULFATE 4 MG/ML
4 INJECTION INTRAVENOUS ONCE
Status: COMPLETED | OUTPATIENT
Start: 2023-09-28 | End: 2023-09-28

## 2023-09-28 RX ORDER — TAMSULOSIN HYDROCHLORIDE 0.4 MG/1
0.4 CAPSULE ORAL ONCE
Status: COMPLETED | OUTPATIENT
Start: 2023-09-28 | End: 2023-09-28

## 2023-09-28 RX ORDER — ONDANSETRON 2 MG/ML
4 INJECTION INTRAMUSCULAR; INTRAVENOUS ONCE
Status: COMPLETED | OUTPATIENT
Start: 2023-09-28 | End: 2023-09-28

## 2023-09-28 RX ORDER — TAMSULOSIN HYDROCHLORIDE 0.4 MG/1
0.4 CAPSULE ORAL DAILY
Qty: 5 CAPSULE | Refills: 0 | Status: SHIPPED | OUTPATIENT
Start: 2023-09-28 | End: 2023-10-03

## 2023-09-28 RX ORDER — OXYCODONE HYDROCHLORIDE 5 MG/1
5 TABLET ORAL EVERY 6 HOURS PRN
Qty: 10 TABLET | Refills: 0 | Status: SHIPPED | OUTPATIENT
Start: 2023-09-28 | End: 2023-10-01

## 2023-09-28 RX ORDER — SODIUM CHLORIDE, SODIUM LACTATE, POTASSIUM CHLORIDE, AND CALCIUM CHLORIDE .6; .31; .03; .02 G/100ML; G/100ML; G/100ML; G/100ML
1000 INJECTION, SOLUTION INTRAVENOUS ONCE
Status: COMPLETED | OUTPATIENT
Start: 2023-09-28 | End: 2023-09-28

## 2023-09-28 RX ORDER — ONDANSETRON HYDROCHLORIDE 8 MG/1
8 TABLET, FILM COATED ORAL EVERY 8 HOURS PRN
Qty: 20 TABLET | Refills: 0 | Status: SHIPPED | OUTPATIENT
Start: 2023-09-28

## 2023-09-28 RX ORDER — HYDROMORPHONE HYDROCHLORIDE 1 MG/ML
1 INJECTION, SOLUTION INTRAMUSCULAR; INTRAVENOUS; SUBCUTANEOUS ONCE
Status: COMPLETED | OUTPATIENT
Start: 2023-09-28 | End: 2023-09-28

## 2023-09-28 RX ADMIN — KETOROLAC TROMETHAMINE 15 MG: 30 INJECTION, SOLUTION INTRAMUSCULAR; INTRAVENOUS at 03:43

## 2023-09-28 RX ADMIN — SODIUM CHLORIDE, POTASSIUM CHLORIDE, SODIUM LACTATE AND CALCIUM CHLORIDE 1000 ML: 600; 310; 30; 20 INJECTION, SOLUTION INTRAVENOUS at 05:00

## 2023-09-28 RX ADMIN — HYDROMORPHONE HYDROCHLORIDE 1 MG: 1 INJECTION, SOLUTION INTRAMUSCULAR; INTRAVENOUS; SUBCUTANEOUS at 06:05

## 2023-09-28 RX ADMIN — FENTANYL CITRATE 50 MCG: 50 INJECTION, SOLUTION INTRAMUSCULAR; INTRAVENOUS at 04:08

## 2023-09-28 RX ADMIN — ONDANSETRON 4 MG: 2 INJECTION INTRAMUSCULAR; INTRAVENOUS at 08:20

## 2023-09-28 RX ADMIN — MORPHINE SULFATE 4 MG: 4 INJECTION INTRAVENOUS at 04:45

## 2023-09-28 RX ADMIN — TAMSULOSIN HYDROCHLORIDE 0.4 MG: 0.4 CAPSULE ORAL at 04:59

## 2023-09-28 RX ADMIN — KETOROLAC TROMETHAMINE 15 MG: 30 INJECTION, SOLUTION INTRAMUSCULAR; INTRAVENOUS at 08:20

## 2023-09-28 RX ADMIN — ONDANSETRON 4 MG: 2 INJECTION INTRAMUSCULAR; INTRAVENOUS at 03:43

## 2023-09-28 ASSESSMENT — PAIN - FUNCTIONAL ASSESSMENT
PAIN_FUNCTIONAL_ASSESSMENT: ACTIVITIES ARE NOT PREVENTED
PAIN_FUNCTIONAL_ASSESSMENT: ACTIVITIES ARE NOT PREVENTED
PAIN_FUNCTIONAL_ASSESSMENT: 0-10
PAIN_FUNCTIONAL_ASSESSMENT: ACTIVITIES ARE NOT PREVENTED
PAIN_FUNCTIONAL_ASSESSMENT: ACTIVITIES ARE NOT PREVENTED

## 2023-09-28 ASSESSMENT — PAIN DESCRIPTION - DESCRIPTORS
DESCRIPTORS: DISCOMFORT;STABBING
DESCRIPTORS: STABBING
DESCRIPTORS: DISCOMFORT
DESCRIPTORS: SQUEEZING
DESCRIPTORS: SHARP;SHOOTING

## 2023-09-28 ASSESSMENT — PAIN DESCRIPTION - ORIENTATION
ORIENTATION: LEFT;RIGHT
ORIENTATION: RIGHT

## 2023-09-28 ASSESSMENT — PAIN DESCRIPTION - ONSET: ONSET: ON-GOING

## 2023-09-28 ASSESSMENT — PAIN SCALES - GENERAL
PAINLEVEL_OUTOF10: 10
PAINLEVEL_OUTOF10: 10
PAINLEVEL_OUTOF10: 4
PAINLEVEL_OUTOF10: 10

## 2023-09-28 ASSESSMENT — PAIN DESCRIPTION - LOCATION
LOCATION: FLANK;BACK
LOCATION: BACK;FLANK
LOCATION: FLANK;BACK
LOCATION: BACK;FLANK
LOCATION: FLANK

## 2023-09-28 ASSESSMENT — PAIN DESCRIPTION - PAIN TYPE: TYPE: ACUTE PAIN

## 2023-09-28 ASSESSMENT — PAIN DESCRIPTION - FREQUENCY: FREQUENCY: CONTINUOUS

## 2023-09-28 NOTE — ED TRIAGE NOTES
Patient arrived in the ER with c/o flank and low back.  Patient states that the pain started yesterday around 10:00 pm. Patient also states that she is have n/v.

## 2023-09-28 NOTE — DISCHARGE INSTRUCTIONS
You were diagnosed with kidney stones in the emergency department today. There was no sign of infection and based on our tests we believe that the stone will likely pass on its own in the next week. After you leave the emergency department:  1) Drink at least 6-8 glasses of water per day until your urine is pale yellow. Avoid caffeine and alcohol and sugary beverages. 2) Take the medications you were prescribed  3) Follow up with your physician. If you are not feeling better within the next 2 days call to make an appointment with Urology (information attached). Return to the ED for fevers > 101, worsening pain, or difficulty eating/drinking due to nausea and vomiting.

## 2023-10-06 ENCOUNTER — APPOINTMENT (OUTPATIENT)
Dept: CT IMAGING | Age: 58
End: 2023-10-06
Payer: COMMERCIAL

## 2023-10-06 ENCOUNTER — HOSPITAL ENCOUNTER (EMERGENCY)
Age: 58
Discharge: HOME | End: 2023-10-06
Attending: EMERGENCY MEDICINE
Payer: COMMERCIAL

## 2023-10-06 VITALS
OXYGEN SATURATION: 98 % | RESPIRATION RATE: 18 BRPM | HEART RATE: 67 BPM | TEMPERATURE: 98.3 F | SYSTOLIC BLOOD PRESSURE: 143 MMHG | BODY MASS INDEX: 29.16 KG/M2 | HEIGHT: 63 IN | DIASTOLIC BLOOD PRESSURE: 80 MMHG | WEIGHT: 164.6 LBS

## 2023-10-06 DIAGNOSIS — K57.32 DIVERTICULITIS OF COLON: Primary | ICD-10-CM

## 2023-10-06 LAB
ALBUMIN SERPL-MCNC: 4.2 G/DL (ref 3.4–5)
ALBUMIN/GLOB SERPL: 1.4 {RATIO} (ref 1.1–2.2)
ALP SERPL-CCNC: 98 U/L (ref 40–129)
ALT SERPL-CCNC: 35 U/L (ref 10–40)
AMORPH SED URNS QL MICRO: ABNORMAL /HPF
ANION GAP SERPL CALCULATED.3IONS-SCNC: 14 MMOL/L (ref 3–16)
AST SERPL-CCNC: 27 U/L (ref 15–37)
BACTERIA URNS QL MICRO: ABNORMAL /HPF
BASOPHILS # BLD: 0.1 K/UL (ref 0–0.2)
BASOPHILS NFR BLD: 0.7 %
BILIRUB SERPL-MCNC: 0.3 MG/DL (ref 0–1)
BILIRUB UR QL STRIP.AUTO: NEGATIVE
BUN SERPL-MCNC: 18 MG/DL (ref 7–20)
CALCIUM SERPL-MCNC: 9.2 MG/DL (ref 8.3–10.6)
CHLORIDE SERPL-SCNC: 107 MMOL/L (ref 99–110)
CLARITY UR: CLEAR
CO2 SERPL-SCNC: 20 MMOL/L (ref 21–32)
COLOR UR: YELLOW
CREAT SERPL-MCNC: 0.6 MG/DL (ref 0.6–1.1)
DEPRECATED RDW RBC AUTO: 13.5 % (ref 12.4–15.4)
EOSINOPHIL # BLD: 0.1 K/UL (ref 0–0.6)
EOSINOPHIL NFR BLD: 1.3 %
EPI CELLS #/AREA URNS HPF: ABNORMAL /HPF (ref 0–5)
GFR SERPLBLD CREATININE-BSD FMLA CKD-EPI: >60 ML/MIN/{1.73_M2}
GLUCOSE SERPL-MCNC: 104 MG/DL (ref 70–99)
GLUCOSE UR STRIP.AUTO-MCNC: NEGATIVE MG/DL
HCT VFR BLD AUTO: 45.7 % (ref 36–48)
HGB BLD-MCNC: 15.2 G/DL (ref 12–16)
HGB UR QL STRIP.AUTO: ABNORMAL
KETONES UR STRIP.AUTO-MCNC: NEGATIVE MG/DL
LEUKOCYTE ESTERASE UR QL STRIP.AUTO: NEGATIVE
LIPASE SERPL-CCNC: 23 U/L (ref 13–60)
LYMPHOCYTES # BLD: 2.2 K/UL (ref 1–5.1)
LYMPHOCYTES NFR BLD: 20 %
MCH RBC QN AUTO: 33.1 PG (ref 26–34)
MCHC RBC AUTO-ENTMCNC: 33.3 G/DL (ref 31–36)
MCV RBC AUTO: 99.3 FL (ref 80–100)
MONOCYTES # BLD: 0.9 K/UL (ref 0–1.3)
MONOCYTES NFR BLD: 8.4 %
NEUTROPHILS # BLD: 7.6 K/UL (ref 1.7–7.7)
NEUTROPHILS NFR BLD: 69.6 %
NITRITE UR QL STRIP.AUTO: NEGATIVE
PH UR STRIP.AUTO: 6 [PH] (ref 5–8)
PLATELET # BLD AUTO: 238 K/UL (ref 135–450)
PMV BLD AUTO: 8.5 FL (ref 5–10.5)
POTASSIUM SERPL-SCNC: 3.6 MMOL/L (ref 3.5–5.1)
PROT SERPL-MCNC: 7.1 G/DL (ref 6.4–8.2)
PROT UR STRIP.AUTO-MCNC: NEGATIVE MG/DL
RBC # BLD AUTO: 4.6 M/UL (ref 4–5.2)
RBC #/AREA URNS HPF: ABNORMAL /HPF (ref 0–4)
SODIUM SERPL-SCNC: 141 MMOL/L (ref 136–145)
SP GR UR STRIP.AUTO: 1.02 (ref 1–1.03)
UA COMPLETE W REFLEX CULTURE PNL UR: ABNORMAL
UA DIPSTICK W REFLEX MICRO PNL UR: YES
URN SPEC COLLECT METH UR: ABNORMAL
UROBILINOGEN UR STRIP-ACNC: 0.2 E.U./DL
WBC # BLD AUTO: 10.9 K/UL (ref 4–11)
WBC #/AREA URNS HPF: ABNORMAL /HPF (ref 0–5)

## 2023-10-06 PROCEDURE — 81001 URINALYSIS AUTO W/SCOPE: CPT

## 2023-10-06 PROCEDURE — 74177 CT ABD & PELVIS W/CONTRAST: CPT

## 2023-10-06 PROCEDURE — 99285 EMERGENCY DEPT VISIT HI MDM: CPT

## 2023-10-06 PROCEDURE — 96375 TX/PRO/DX INJ NEW DRUG ADDON: CPT

## 2023-10-06 PROCEDURE — 80053 COMPREHEN METABOLIC PANEL: CPT

## 2023-10-06 PROCEDURE — 83690 ASSAY OF LIPASE: CPT

## 2023-10-06 PROCEDURE — 96374 THER/PROPH/DIAG INJ IV PUSH: CPT

## 2023-10-06 PROCEDURE — 6360000004 HC RX CONTRAST MEDICATION: Performed by: PHYSICIAN ASSISTANT

## 2023-10-06 PROCEDURE — 36415 COLL VENOUS BLD VENIPUNCTURE: CPT

## 2023-10-06 PROCEDURE — 85025 COMPLETE CBC W/AUTO DIFF WBC: CPT

## 2023-10-06 PROCEDURE — 2580000003 HC RX 258: Performed by: PHYSICIAN ASSISTANT

## 2023-10-06 PROCEDURE — 6360000002 HC RX W HCPCS: Performed by: PHYSICIAN ASSISTANT

## 2023-10-06 RX ORDER — SODIUM CHLORIDE, SODIUM LACTATE, POTASSIUM CHLORIDE, AND CALCIUM CHLORIDE .6; .31; .03; .02 G/100ML; G/100ML; G/100ML; G/100ML
1000 INJECTION, SOLUTION INTRAVENOUS ONCE
Status: COMPLETED | OUTPATIENT
Start: 2023-10-06 | End: 2023-10-06

## 2023-10-06 RX ORDER — NAPROXEN 500 MG/1
500 TABLET ORAL 2 TIMES DAILY WITH MEALS
Qty: 30 TABLET | Refills: 1 | Status: SHIPPED | OUTPATIENT
Start: 2023-10-06

## 2023-10-06 RX ORDER — AMOXICILLIN AND CLAVULANATE POTASSIUM 875; 125 MG/1; MG/1
1 TABLET, FILM COATED ORAL 2 TIMES DAILY
Qty: 20 TABLET | Refills: 0 | Status: SHIPPED | OUTPATIENT
Start: 2023-10-06 | End: 2023-10-16

## 2023-10-06 RX ORDER — HYDROMORPHONE HYDROCHLORIDE 1 MG/ML
0.5 INJECTION, SOLUTION INTRAMUSCULAR; INTRAVENOUS; SUBCUTANEOUS
Status: COMPLETED | OUTPATIENT
Start: 2023-10-06 | End: 2023-10-06

## 2023-10-06 RX ORDER — ONDANSETRON 4 MG/1
4 TABLET, ORALLY DISINTEGRATING ORAL EVERY 6 HOURS PRN
Qty: 20 TABLET | Refills: 0 | Status: SHIPPED | OUTPATIENT
Start: 2023-10-06

## 2023-10-06 RX ORDER — ONDANSETRON 2 MG/ML
4 INJECTION INTRAMUSCULAR; INTRAVENOUS ONCE
Status: COMPLETED | OUTPATIENT
Start: 2023-10-06 | End: 2023-10-06

## 2023-10-06 RX ADMIN — ONDANSETRON 4 MG: 2 INJECTION INTRAMUSCULAR; INTRAVENOUS at 15:37

## 2023-10-06 RX ADMIN — HYDROMORPHONE HYDROCHLORIDE 0.5 MG: 1 INJECTION, SOLUTION INTRAMUSCULAR; INTRAVENOUS; SUBCUTANEOUS at 15:37

## 2023-10-06 RX ADMIN — SODIUM CHLORIDE, POTASSIUM CHLORIDE, SODIUM LACTATE AND CALCIUM CHLORIDE 1000 ML: 600; 310; 30; 20 INJECTION, SOLUTION INTRAVENOUS at 15:37

## 2023-10-06 RX ADMIN — IOPAMIDOL 75 ML: 755 INJECTION, SOLUTION INTRAVENOUS at 16:22

## 2023-10-06 ASSESSMENT — ENCOUNTER SYMPTOMS
NAUSEA: 1
RESPIRATORY NEGATIVE: 1
ABDOMINAL PAIN: 1
DIARRHEA: 1
SHORTNESS OF BREATH: 0
VOMITING: 1

## 2023-10-06 ASSESSMENT — PAIN DESCRIPTION - ORIENTATION: ORIENTATION: LOWER

## 2023-10-06 ASSESSMENT — PAIN - FUNCTIONAL ASSESSMENT: PAIN_FUNCTIONAL_ASSESSMENT: 0-10

## 2023-10-06 ASSESSMENT — PAIN SCALES - GENERAL: PAINLEVEL_OUTOF10: 10

## 2023-10-06 ASSESSMENT — PAIN DESCRIPTION - LOCATION: LOCATION: ABDOMEN

## 2023-10-06 NOTE — ED TRIAGE NOTES
Pt brought in by EMS for lower abd pain and n/v since 4 am yesterday. Pt states she has hx of crohns but this is a different pain.  Denies fevers, diarrhea, or painful urination

## 2023-10-12 NOTE — ED PROVIDER NOTES
ED Attending Attestation Note     Date of evaluation: 10/6/2023    This patient was seen by the advance practice provider. I have seen and examined the patient, agree with the workup, evaluation, management and diagnosis. The care plan has been discussed. My assessment reveals over abdominal pain on palpation. 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 Reports history of Crohn's in the past but no current medications.   No vaginal bleeding noted    PMH--Crohn's in the past, diverticulosis     Jaison Toribio MD  10/12/23 [id number]

## 2023-11-28 NOTE — OP NOTE
[address], [address]                                OPERATIVE REPORT    PATIENT NAME: MICHAEL RECINOS                      :        1965  MED REC NO:   [id number]                          ROOM:  ACCOUNT NO:   [de-identified]                           ADMIT DATE: 10/18/2022  PROVIDER:     Laura Harris DO    DATE OF PROCEDURE:  10/18/2022    PREOPERATIVE DIAGNOSIS:  Mucocele of appendix. POSTOPERATIVE DIAGNOSES:  1.  Mucocele of appendix. 2.  Peritoneal nodules. OPERATIONS PERFORMED:  1. Laparoscopic appendectomy. 2.  Peritoneal lavage with washing. 3.  Removal of peritoneal nodules. 4.  Omental biopsy. SURGEON:  Laura Harris DO    ASSISTANTS:  1.  Iliana Arce. [facility]. ANESTHESIA:  General endotracheal.    COMPLICATIONS:  None. CONDITION:  Stable. INDICATIONS FOR PROCEDURE:  The patient is a 26-year-old female with a  CT scan finding of a large cyst at the tip of her appendix. She was  referred to me by the ER as she did not want to stay for her surgery due  to . I expeditiously brought her to the operating room for  elective appendectomy due to her anxiety after understanding all risks,  benefits, and alternatives of the procedure. DESCRIPTION OF PROCEDURE:  The patient was brought to the operative  suite, laid in supine position with arms outstretched. After induction  of general endotracheal anesthesia, tube was confirmed to be in place  with end-tidal CO2 and secured. A Magdaleno catheter was placed with clear  return of urine. The patient was prepped and draped in the usual  sterile manner. A small incision was made at the left midclavicular line and using the  Veress needle, abdomen was entered and pneumoperitoneum established with  15 mm of CO2. A 5-mm 30-degree camera housed in a 5-mm Optiview trocar  was used to enter the abdomen under direct visualization. Once inside  the abdomen, two additional trocars were placed and attention was paid  to the appendix. Clear large cystic structure was seen at the tip of  the appendix as well as significant studding of the small bowel and two  independent nodules of the peritoneum which were seen on the left  abdominal wall. Attention was first paid to the appendectomy which was  performed by making a window in the mesoappendix and taken LigaSure to  take the mesentery all the way to the base of the appendix. Once this  was accomplished, an Ethicon [place] Flex 60 white staple load was used  to transect the appendix at its base. Once this was accomplished, the  staple line was seen to be hemostatic and specimen was removed using a  specimen bag through the 12-mm port site. Hemostasis was maintained. At that point, peritoneal washings were done by lavaging the peritoneum  along with the previous appendectomy site and using a Lukens trap to  wash all of this area and retriev the fluid. Once this was accomplished, laparoscopic omental biopsy was performed by  taking the LigaSure device and taking the distal end of the omentum with  the LigaSure, approximately a 3 cm x 3 cm piece was taken, put into a  5-mm specimen bag and retrieved and sent for permanent pathologic  examination. At that point, the two peritoneal studs, which were very small in size,  were completely excised with cold cut using Endo Jeevan to completely  excise both independent peritoneal studs. Once this was accomplished,  this was placed on [facility] and sent as specimen for permanent pathologic  examination. Several pictures were taken, and 12-mm port site was closed at the  fascial level using 0 Vicryl suture in a suture passer device. The  patient tolerated the procedure well and was brought to the  postanesthesia care unit in stable condition. All sponge, needle, and instrument counts were correct x2.     I personally informed the patient's Resting    Vital Signs Last 24 Hrs  T(C): 36.8 (11-28-23 @ 11:42), Max: 36.8 (11-28-23 @ 11:42)  T(F): 98.2 (11-28-23 @ 11:42), Max: 98.2 (11-28-23 @ 11:42)  HR: 82 (11-28-23 @ 11:42) (76 - 82)  BP: 137/78 (11-28-23 @ 11:42) (131/63 - 148/80)  RR: 18 (11-28-23 @ 11:42) (18 - 18)  SpO2: 95% (11-28-23 @ 11:42) (95% - 99%)    I&O's Detail    27 Nov 2023 07:01  -  28 Nov 2023 07:00  --------------------------------------------------------  IN:    Other (mL): 500 mL  Total IN: 500 mL    OUT:    Other (mL): 2000 mL  Total OUT: 2000 mL    s1s2  b/l air entry  soft, ND  tr edema, AVF patent                                                                                                       8.1    3.92  )-----------( 214      ( 27 Nov 2023 13:28 )             25.5     27 Nov 2023 13:28    140    |  95     |  94     ----------------------------<  62     5.0     |  26     |  12.70    Ca    9.1        27 Nov 2023 13:28  Phos  6.2       27 Nov 2023 13:28    chlorhexidine 2% Cloths 1 Application(s) Topical <User Schedule>  cinacalcet 30 milliGRAM(s) Oral daily  epoetin carito (EPOGEN) Injectable 49678 Unit(s) IV Push <User Schedule>  heparin   Injectable 5000 Unit(s) SubCutaneous every 8 hours  hydrALAZINE 25 milliGRAM(s) Oral three times a day  HYDROmorphone  Injectable 2 milliGRAM(s) IV Push every 8 hours PRN  metoprolol tartrate 25 milliGRAM(s) Oral two times a day  oxyCODONE    IR 15 milliGRAM(s) Oral every 6 hours PRN  sevelamer carbonate 1600 milliGRAM(s) Oral three times a day    A/P:    Hx CVA, ESRD on HD  Hx fall, L femoral neck fracture, s/p L hemiarthroplasty 8/15/23  S/p rehab  D/c-d home recently  S/p fall at home, L hip pain, re-adm 11/20/23   No acute fx on CT  HD tomorrow as ordered   Epoetin w/HD 3 x week  Renal diet, Renvela   [facility]    [phone]       family of the findings of the  operation.         Desmond Morrison DO    D: 10/18/2022 16:39:20       T: 10/18/2022 16:41:55     [email or web address]  Job#: [id number]     Doc#: [id number]    CC:

## 2024-08-01 ENCOUNTER — TRANSCRIBE ORDERS (OUTPATIENT)
Dept: ADMINISTRATIVE | Age: 59
End: 2024-08-01

## 2024-08-01 DIAGNOSIS — N28.89 OTHER SPECIFIED DISORDERS OF KIDNEY AND URETER: ICD-10-CM

## 2024-08-01 DIAGNOSIS — N13.30 HYDRONEPHROSIS, UNSPECIFIED HYDRONEPHROSIS TYPE: Primary | ICD-10-CM

## 2024-09-06 ENCOUNTER — HOSPITAL ENCOUNTER (OUTPATIENT)
Dept: MRI IMAGING | Age: 59
Discharge: HOME | End: 2024-09-06
Attending: UROLOGY
Payer: COMMERCIAL

## 2024-09-06 DIAGNOSIS — N13.30 HYDRONEPHROSIS, UNSPECIFIED HYDRONEPHROSIS TYPE: ICD-10-CM

## 2024-09-06 DIAGNOSIS — N28.89 OTHER SPECIFIED DISORDERS OF KIDNEY AND URETER: ICD-10-CM

## 2024-09-06 PROCEDURE — 74183 MRI ABD W/O CNTR FLWD CNTR: CPT

## 2024-09-06 PROCEDURE — A9579 GAD-BASE MR CONTRAST NOS,1ML: HCPCS | Performed by: UROLOGY

## 2024-09-06 PROCEDURE — 6360000004 HC RX CONTRAST MEDICATION: Performed by: UROLOGY

## 2024-09-06 RX ADMIN — GADOTERIDOL 15 ML: 279.3 INJECTION, SOLUTION INTRAVENOUS at 08:18

## 2025-07-01 NOTE — TELEPHONE ENCOUNTER
Patient called back stating she had a coughing episode about 20-25 minuets ago and had a bad pull in her side again. After this she said she said went to the restroom and had very dark urine.     Please call: [phone]
Patient called stating she has swelling, soreness and pulling on her left side. This started after getting up after after a CT. She would like to know if she needs to come in to be seen.     Please call: [phone]
Spoke to provider and pt. Rx celebrex being sent over to pt pharm. Did let pt know to contact PCP for the darker yellow color urine. Pt understood      Pt calling in, s/p appy 10/18/22, had CT and Xray 10/27/22:  with the following complaints:    Swelling, soreness and pulling on her left side, pt stated started after getting up after CT. Patient then called back stating she had a coughing episode about 20-25 minuets ago and had a bad pull in her side again. After this she said she said went to the restroom and had very dark urine.      Please call: [phone]    Please advise
Unknown if ever smoked

## (undated) DEVICE — SUTURE VCRL SZ 4-0 L18IN ABSRB UD L19MM PS-2 3/8 CIR PRIM J496H

## (undated) DEVICE — TROCAR: Brand: KII FIOS FIRST ENTRY

## (undated) DEVICE — GENERAL LAPAROSCOPIC: Brand: MEDLINE INDUSTRIES, INC.

## (undated) DEVICE — PUMP SUC IRR TBNG L10FT W/ HNDPC ASSEMB STRYKEFLOW 2

## (undated) DEVICE — GLOVE SURG SZ 75 L12IN THK75MIL DK GRN LTX FREE

## (undated) DEVICE — GAUZE,SPONGE,4"X4",16PLY,XRAY,STRL,LF: Brand: MEDLINE

## (undated) DEVICE — PACK,CYSTOSCOPY,PK III,AURORA: Brand: MEDLINE

## (undated) DEVICE — SUTURE VCRL SZ 0 L54IN ABSRB VLT W/O NDL POLYGLACTIN 910 J616H

## (undated) DEVICE — JELLY LUBE BTL MDS032275 4OZ

## (undated) DEVICE — SOLUTION IV 1000ML 0.9% SOD CHL

## (undated) DEVICE — STAPLER SKIN L440MM 32MM LNG 12 FIRING B FRM PWR + GRIPPING

## (undated) DEVICE — BAG DRNGE 2000ML UROLOGY ANTI REFLX CHMBR SAMP PRT

## (undated) DEVICE — TOWEL,STOP FLAG GOLD N-W: Brand: MEDLINE

## (undated) DEVICE — NEEDLE INSUF L120MM DIA2MM DISP FOR PNEUMOPERI ENDOPATH

## (undated) DEVICE — Device

## (undated) DEVICE — Z INACTIVE NO SUPPLIER SOLUTIONIRRIG 3000ML 0.9% SOD CHL FLX CONT [79720808] [HOSPIRA WORLDWIDE INC]

## (undated) DEVICE — TUBING IRRIG L96IN DIA0.241IN L BOR T-U-R W/ NVENT PIERCING

## (undated) DEVICE — TOTAL TRAY, 16FR 10ML SIL FOLEY, URN: Brand: MEDLINE

## (undated) DEVICE — SEALER/DIVIDER LAP SHFT L37CM JAW APER 11.4MM 315DEG ROT

## (undated) DEVICE — MEDIA CONTRAST INJ OPTIRAY 300 50 ML

## (undated) DEVICE — TISSUE RETRIEVAL SYSTEM: Brand: INZII RETRIEVAL SYSTEM

## (undated) DEVICE — CATHETER URETH 22FR 30CC BLLN F 3 W SPEC M RND TIP TWO

## (undated) DEVICE — SOLUTION INJ LR VISIV 1000ML BG

## (undated) DEVICE — 40583 XL ADVANCED TRENDELENBURG POSITIONING KIT: Brand: 40583 XL ADVANCED TRENDELENBURG POSITIONING KIT

## (undated) DEVICE — BINDER ABD UNIV H9IN WAIST 45-62IN E SFT COT PREM 3 PNL

## (undated) DEVICE — GOWN,SIRUS,POLYRNF,BRTHSLV,XL,30/CS: Brand: MEDLINE

## (undated) DEVICE — SYRINGE MED 10ML LUERLOCK TIP W/O SFTY DISP

## (undated) DEVICE — APPLICATOR MEDICATED 26 CC SOLUTION HI LT ORNG CHLORAPREP

## (undated) DEVICE — PREP TRAY 10X5X2: Brand: MEDLINE INDUSTRIES, INC.

## (undated) DEVICE — SAFESECURE,SECUREMENT,FOLEY CATH,STERILE: Brand: MEDLINE

## (undated) DEVICE — ANTI-FOG SOLUTION WITH FOAM PAD: Brand: DEVON

## (undated) DEVICE — TRAY PREP DRY W/ PREM GLV 2 APPL 6 SPNG 2 UNDPD 1 OVERWRAP

## (undated) DEVICE — LAPAROSCOPIC SCISSORS: Brand: EPIX LAPAROSCOPIC SCISSORS

## (undated) DEVICE — OPEN-END FLEXI-TIP URETERAL CATHETER: Brand: FLEXI-TIP

## (undated) DEVICE — Z INACTIVE USE 2735373 APPLICATOR FBR LAIN COT WOOD TIP ECONOMICAL

## (undated) DEVICE — MEDICINE CUP, GRADUATED, STER: Brand: MEDLINE

## (undated) DEVICE — Z DISCONTINUED USE 2272114 DRAPE SURG UTIL 26X15 IN W/ TAPE N INVASIVE MULTLYR DISP

## (undated) DEVICE — GUIDEWIRE ENDOSCP L150CM DIA0.035IN TIP 3CM PTFE NIT

## (undated) DEVICE — NEEDLE HYPO 22GA L1.5IN BLK POLYPR HUB S STL REG BVL STR

## (undated) DEVICE — GLOVE ORANGE PI 7   MSG9070